# Patient Record
Sex: FEMALE | Race: WHITE | NOT HISPANIC OR LATINO | Employment: OTHER | URBAN - METROPOLITAN AREA
[De-identification: names, ages, dates, MRNs, and addresses within clinical notes are randomized per-mention and may not be internally consistent; named-entity substitution may affect disease eponyms.]

---

## 2017-03-01 ENCOUNTER — OFFICE VISIT (OUTPATIENT)
Dept: ENDOCRINOLOGY | Age: 57
End: 2017-03-01

## 2017-03-01 VITALS
BODY MASS INDEX: 35.16 KG/M2 | SYSTOLIC BLOOD PRESSURE: 106 MMHG | DIASTOLIC BLOOD PRESSURE: 66 MMHG | HEIGHT: 67 IN | WEIGHT: 224 LBS

## 2017-03-01 DIAGNOSIS — R13.10 DYSPHAGIA, UNSPECIFIED TYPE: ICD-10-CM

## 2017-03-01 DIAGNOSIS — E55.9 VITAMIN D DEFICIENCY DISEASE: ICD-10-CM

## 2017-03-01 DIAGNOSIS — E66.9 GENERALIZED OBESITY: ICD-10-CM

## 2017-03-01 DIAGNOSIS — Z78.0 MENOPAUSE PRESENT: ICD-10-CM

## 2017-03-01 DIAGNOSIS — R73.03 PREDIABETES: Primary | ICD-10-CM

## 2017-03-01 DIAGNOSIS — Z01.419 ENCOUNTER FOR GYNECOLOGICAL EXAMINATION WITHOUT ABNORMAL FINDING: ICD-10-CM

## 2017-03-01 PROCEDURE — 99214 OFFICE O/P EST MOD 30 MIN: CPT | Performed by: NURSE PRACTITIONER

## 2017-03-01 RX ORDER — ERGOCALCIFEROL 1.25 MG/1
CAPSULE ORAL
Qty: 12 CAPSULE | Refills: 1 | Status: SHIPPED | OUTPATIENT
Start: 2017-03-01 | End: 2017-08-15 | Stop reason: SDUPTHER

## 2017-03-01 NOTE — PROGRESS NOTES
"Clarice Craft is a 56 y.o. female is here today for follow-up.  Chief Complaint   Patient presents with   • Prediabetes     recent labs   • dyslipidemia   • hyperuricemia     Visit Vitals   • /66   • Ht 67\" (170.2 cm)   • Wt 224 lb (102 kg)   • BMI 35.08 kg/m2     Current Outpatient Prescriptions on File Prior to Visit   Medication Sig   • allopurinol (ZYLOPRIM) 100 MG tablet Take 1 tablet by mouth Daily.   • Calcium-Ergocalciferol 250-125 MG-UNIT tablet Take  by mouth.   • Cholecalciferol (VITAMIN D3) 1000 UNITS capsule Take 1,000 Units by mouth Daily.   • CONTRAVE 8-90 MG tablet Take 2 tablets by mouth 2 (Two) Times a Day.   • Cyanocobalamin (VITAMIN B-12) 1000 MCG/15ML liquid Take  by mouth.   • DIGESTIVE AIDS MIXTURE PO Take  by mouth.   • FOLIC ACID PO Take 133 mcg by mouth Daily.   • Misc Natural Products (TOTAL CARDIO HEALTH FORMULA) capsule Take  by mouth.   • Multiple Vitamin (MULTIVITAMIN) tablet Take 1 tablet by mouth.   • Nutritional Supplements (MENOPAUSE FORMULA PO) Take  by mouth.   • Omega 3 1200 MG capsule Take  by mouth.   • Probiotic Product (PROBIOTIC ADVANCED PO) Take  by mouth.   • rosuvastatin (CRESTOR) 10 MG tablet Take 1 tablet by mouth Every Night.   • Specialty Vitamins Products (HEART HEALTH PACK PO) Take  by mouth.   • MELALEUCA SC Inject  under the skin.     No current facility-administered medications on file prior to visit.      Family History   Problem Relation Age of Onset   • Thyroid disease Maternal Grandmother      Social History   Substance Use Topics   • Smoking status: Never Smoker   • Smokeless tobacco: None   • Alcohol use Yes     No Known Allergies      History of Present Illness  Encounter Diagnoses   Name Primary?   • Vitamin D deficiency disease    • Menopause present    • Generalized obesity    • Encounter for gynecological examination without abnormal finding    • Prediabetes Yes   • Dysphagia, unspecified type      This is a 56-year-old female " patient here today for routine follow-up visit of the above-mentioned problems.  She is agitated at today's visit because of her weight prior to be in seen.  She has a family history of hypothyroidism however she herself has not been diagnosed.  She had recent labs from her primary care provider which was reviewed at show that she has prediabetes with an A1c of 5.8.  She has been attending classes over at [facility] for diabetes prevention.  She has been taking over-the-counter vitamin D and her recent vitamin D level shows it is still low.  She has been following with her primary care provider for vitamin D as well as the prediabetes.  She has a history of menopausal symptoms however she states her mother had a history of breast cancer so she is not a candidate for hormone replacement therapy.  Her recent cholesterol was checked by her primary care provider which shows it was in satisfactory range.  She is complaining of problems with dysphasia history she is had a growth in her neck that she was told about in the past however she feels it has not been checked in the past 3-4 years and she is not exactly sure if it was a thyroid nodule.          The following portions of the patient's history were reviewed and updated as appropriate: allergies, current medications, past family history, past medical history, past social history, past surgical history and problem list.    Review of Systems   Constitutional: Negative for chills.   HENT: Negative for drooling.    Eyes: Negative for redness.   Respiratory: Negative for choking.    Cardiovascular: Negative for palpitations.   Gastrointestinal: Negative for abdominal distention.   Endocrine: Negative for polydipsia.   Genitourinary: Negative for dysuria.   Musculoskeletal: Negative for neck pain.   Skin: Negative for color change.   Allergic/Immunologic: Negative for food allergies.   Neurological: Negative for tremors.   Hematological: Negative for adenopathy.    Psychiatric/Behavioral: The patient is not hyperactive.        Objective   Physical Exam   Constitutional: She is oriented to person, place, and time. She appears well-developed and well-nourished. No distress.   Morbidly obese   HENT:   Head: Normocephalic and atraumatic.   Right Ear: External ear normal.   Left Ear: External ear normal.   Nose: Nose normal.   Mouth/Throat: Oropharynx is clear and moist. No oropharyngeal exudate.   Eyes: Conjunctivae and EOM are normal. Pupils are equal, round, and reactive to light. Right eye exhibits no discharge. Left eye exhibits no discharge. No scleral icterus.   Neck: Normal range of motion. Neck supple. No JVD present. No tracheal deviation present. No thyromegaly present.   Cardiovascular: Normal rate, regular rhythm, normal heart sounds and intact distal pulses.  Exam reveals no gallop and no friction rub.    No murmur heard.  Pulmonary/Chest: Effort normal and breath sounds normal. No stridor. No respiratory distress. She has no wheezes. She has no rales. She exhibits no tenderness.   Abdominal: Soft. Bowel sounds are normal. She exhibits no distension and no mass. There is no tenderness. There is no rebound and no guarding. No hernia.   Musculoskeletal: Normal range of motion. She exhibits no edema, tenderness or deformity.   Lymphadenopathy:     She has no cervical adenopathy.   Neurological: She is alert and oriented to person, place, and time. She has normal reflexes. She displays normal reflexes. No cranial nerve deficit. She exhibits normal muscle tone. Coordination normal.   Skin: Skin is warm and dry. No rash noted. She is not diaphoretic. No erythema. No pallor.   Psychiatric: She has a normal mood and affect. Her behavior is normal. Judgment and thought content normal.   Nursing note reviewed.        Assessment/Plan     Encounter Diagnoses   Name Primary?   • Vitamin D deficiency disease    • Menopause present    • Generalized obesity    • Encounter for  gynecological examination without abnormal finding    • Prediabetes Yes   • Dysphagia, unspecified type          In summary, patient was seen and examined.  She had recent labs done by her primary care provider which were reviewed at today's visit.  She is currently not controlled with her vitamin D deficiency on over-the-counter vitamin D.  Prescription for vitamin D 50,000 units was sent to her pharmacy.  She is complaining of dysphagia at a history of a growth in her neck.  She has no record of a thyroid ultrasound on file.  A thyroid ultrasound will be scheduled.  She is currently attending classes for diabetes prevention but is currently not on any medication.  She is working to lose weight and is on contrave which she states is working with helping her to lose weight.  She will follow-up with Dr. Duran at her next scheduled appointment.  She'll be notified of her thyroid results along with any further recommendations.

## 2017-03-09 ENCOUNTER — APPOINTMENT (OUTPATIENT)
Dept: ULTRASOUND IMAGING | Facility: HOSPITAL | Age: 57
End: 2017-03-09

## 2017-03-15 DIAGNOSIS — R13.10 DYSPHAGIA, UNSPECIFIED TYPE: Primary | ICD-10-CM

## 2017-03-29 ENCOUNTER — TELEPHONE (OUTPATIENT)
Dept: ENDOCRINOLOGY | Age: 57
End: 2017-03-29

## 2017-03-29 NOTE — TELEPHONE ENCOUNTER
CALLED PT LEFT  ASKING TO RETURN CALL IN REGARDS TO HER THRYOID US.        ----- Message from GILBERTO Dash sent at 3/29/2017 12:56 PM EDT -----  Send pt a copy of her thyroid u/s. We will need to monitor the nodules periodically. Let her know. thanks

## 2017-03-30 ENCOUNTER — TELEPHONE (OUTPATIENT)
Dept: ENDOCRINOLOGY | Age: 57
End: 2017-03-30

## 2017-03-30 NOTE — TELEPHONE ENCOUNTER
RETURNED PTS CALL. INFORMED HER THAT I MAILED HER COPY OF HER THYROID U/S AND CAN TAKE A FEW DAYS TO RECEIVE AS WELL AS INFORMED HER THAT WE ARE GOING TO MONITOR HER NODULES PERIODICALLY. INFORMED PT TO CB IF SHE HAS ANY PROBLEMS OR QUEST. PT STATED VERBAL UNDERSTANDING.

## 2017-05-22 DIAGNOSIS — E79.0 HYPERURICEMIA: ICD-10-CM

## 2017-05-22 RX ORDER — ALLOPURINOL 100 MG/1
100 TABLET ORAL DAILY
Qty: 30 TABLET | Refills: 2 | Status: SHIPPED | OUTPATIENT
Start: 2017-05-22 | End: 2017-08-15 | Stop reason: SDUPTHER

## 2017-08-15 ENCOUNTER — OFFICE VISIT (OUTPATIENT)
Dept: ENDOCRINOLOGY | Age: 57
End: 2017-08-15

## 2017-08-15 VITALS
WEIGHT: 223 LBS | HEIGHT: 66 IN | SYSTOLIC BLOOD PRESSURE: 128 MMHG | RESPIRATION RATE: 16 BRPM | DIASTOLIC BLOOD PRESSURE: 88 MMHG | BODY MASS INDEX: 35.84 KG/M2

## 2017-08-15 DIAGNOSIS — E79.0 HYPERURICEMIA: ICD-10-CM

## 2017-08-15 DIAGNOSIS — Z78.0 MENOPAUSE PRESENT: ICD-10-CM

## 2017-08-15 DIAGNOSIS — E66.9 GENERALIZED OBESITY: ICD-10-CM

## 2017-08-15 DIAGNOSIS — E78.5 DYSLIPIDEMIA: ICD-10-CM

## 2017-08-15 DIAGNOSIS — E55.9 VITAMIN D DEFICIENCY DISEASE: ICD-10-CM

## 2017-08-15 DIAGNOSIS — R73.03 PREDIABETES: Primary | ICD-10-CM

## 2017-08-15 PROCEDURE — 99214 OFFICE O/P EST MOD 30 MIN: CPT | Performed by: INTERNAL MEDICINE

## 2017-08-15 RX ORDER — LEVOCETIRIZINE DIHYDROCHLORIDE 5 MG/1
5 TABLET, FILM COATED ORAL EVERY EVENING
COMMUNITY
End: 2018-02-15

## 2017-08-15 RX ORDER — ALLOPURINOL 100 MG/1
100 TABLET ORAL DAILY
Qty: 30 TABLET | Refills: 5 | Status: SHIPPED | OUTPATIENT
Start: 2017-08-15 | End: 2018-02-15 | Stop reason: SDUPTHER

## 2017-08-15 RX ORDER — ERGOCALCIFEROL 1.25 MG/1
CAPSULE ORAL
Qty: 13 CAPSULE | Refills: 3 | Status: SHIPPED | OUTPATIENT
Start: 2017-08-15 | End: 2018-02-15 | Stop reason: SDUPTHER

## 2017-08-15 RX ORDER — ROSUVASTATIN CALCIUM 10 MG/1
10 TABLET, COATED ORAL NIGHTLY
Qty: 30 TABLET | Refills: 11 | Status: SHIPPED | OUTPATIENT
Start: 2017-08-15 | End: 2018-08-25 | Stop reason: SDUPTHER

## 2017-08-15 NOTE — PROGRESS NOTES
"Subjective   Lani Craft is a 57 y.o. female seen for follow up for prediabetes, metabolic syndrome. No lab review. She has been having this deep intense itching on torso area since last winter. She denies any other problems or concerns.     History of Present Illness this is a 57-year-old female known patient with obesity as well as prediabetes and metabolic syndrome as well as hyperuricemia and dyslipidemia.  Over the course of last 6 months she has had no significant health problems for which to go to the emergency room or hospital.  On further questioning she admitted to the fact that she is using with her detergent a fabric softener anti-static sheets.  She has lost total of 30 pounds with diet and exercise and taking Contrave    /88  Resp 16  Ht 66\" (167.6 cm)  Wt 223 lb (101 kg)  BMI 35.99 kg/m2     No Known Allergies    Current Outpatient Prescriptions:   •  allopurinol (ZYLOPRIM) 100 MG tablet, Take 1 tablet by mouth Daily., Disp: 30 tablet, Rfl: 2  •  CONTRAVE 8-90 MG tablet, Take 2 tablets by mouth 2 (Two) Times a Day., Disp: , Rfl:   •  Cyanocobalamin (VITAMIN B-12) 1000 MCG/15ML liquid, Take  by mouth., Disp: , Rfl:   •  DIGESTIVE AIDS MIXTURE PO, Take  by mouth., Disp: , Rfl:   •  ergocalciferol (DRISDOL) 01440 UNITS capsule, Take 1 po q week, Disp: 12 capsule, Rfl: 1  •  FOLIC ACID PO, Take 133 mcg by mouth Daily., Disp: , Rfl:   •  levocetirizine (XYZAL) 5 MG tablet, Take 5 mg by mouth Every Evening., Disp: , Rfl:   •  MELALEUCA SC, Inject  under the skin., Disp: , Rfl:   •  Misc Natural Products (TOTAL CARDIO HEALTH FORMULA) capsule, Take  by mouth., Disp: , Rfl:   •  Multiple Vitamin (MULTIVITAMIN) tablet, Take 1 tablet by mouth., Disp: , Rfl:   •  Nutritional Supplements (MENOPAUSE FORMULA PO), Take  by mouth., Disp: , Rfl:   •  Omega 3 1200 MG capsule, Take  by mouth., Disp: , Rfl:   •  Probiotic Product (PROBIOTIC ADVANCED PO), Take  by mouth., Disp: , Rfl:   •  rosuvastatin (CRESTOR) " 10 MG tablet, Take 1 tablet by mouth Every Night., Disp: 30 tablet, Rfl: 11  •  Specialty Vitamins Products (HEART HEALTH PACK PO), Take  by mouth., Disp: , Rfl:     The following portions of the patient's history were reviewed and updated as appropriate: allergies, current medications, past family history, past medical history, past social history, past surgical history and problem list.    Review of Systems   Constitutional: Negative.    HENT: Negative.    Eyes: Negative.    Respiratory: Negative.    Cardiovascular: Negative.    Gastrointestinal: Negative.    Endocrine: Negative.    Genitourinary: Negative.    Musculoskeletal: Negative.    Skin: Negative.    Allergic/Immunologic: Negative.    Neurological: Negative.    Hematological: Negative.    Psychiatric/Behavioral: Negative.        Objective   Physical Exam   Constitutional: She is oriented to person, place, and time. She appears well-developed and well-nourished. No distress.   Morbidly obese   HENT:   Head: Normocephalic and atraumatic.   Right Ear: External ear normal.   Left Ear: External ear normal.   Nose: Nose normal.   Mouth/Throat: Oropharynx is clear and moist. No oropharyngeal exudate.   Eyes: Conjunctivae and EOM are normal. Pupils are equal, round, and reactive to light. Right eye exhibits no discharge. Left eye exhibits no discharge. No scleral icterus.   Neck: Normal range of motion. Neck supple. No JVD present. No tracheal deviation present. No thyromegaly present.   Cardiovascular: Normal rate, regular rhythm, normal heart sounds and intact distal pulses.  Exam reveals no gallop and no friction rub.    No murmur heard.  Pulmonary/Chest: Effort normal and breath sounds normal. No stridor. No respiratory distress. She has no wheezes. She has no rales. She exhibits no tenderness.   Abdominal: Soft. Bowel sounds are normal. She exhibits no distension and no mass. There is no tenderness. There is no rebound and no guarding. No hernia.    Musculoskeletal: Normal range of motion. She exhibits no edema, tenderness or deformity.   Lymphadenopathy:     She has no cervical adenopathy.   Neurological: She is alert and oriented to person, place, and time. She has normal reflexes. She displays normal reflexes. No cranial nerve deficit. She exhibits normal muscle tone. Coordination normal.   Skin: Skin is warm and dry. No rash noted. She is not diaphoretic. No erythema. No pallor.   Psychiatric: She has a normal mood and affect. Her behavior is normal. Judgment and thought content normal.   Nursing note reviewed.        Assessment/Plan   Diagnoses and all orders for this visit:    Prediabetes  -     T3, Free  -     T4 & TSH ([facility])  -     T4, Free  -     Thyroglobulin With Anti-TG  -     Uric Acid  -     Vitamin D 25 Hydroxy  -     Comprehensive Metabolic Panel  -     C-Peptide  -     Hemoglobin A1c  -     Lipid Panel    Menopause present  -     T3, Free  -     T4 & TSH ([facility])  -     T4, Free  -     Thyroglobulin With Anti-TG  -     Uric Acid  -     Vitamin D 25 Hydroxy  -     Comprehensive Metabolic Panel  -     C-Peptide  -     Hemoglobin A1c  -     Lipid Panel    Vitamin D deficiency disease  -     T3, Free  -     T4 & TSH ([facility])  -     T4, Free  -     Thyroglobulin With Anti-TG  -     Uric Acid  -     Vitamin D 25 Hydroxy  -     Comprehensive Metabolic Panel  -     C-Peptide  -     Hemoglobin A1c  -     Lipid Panel    Generalized obesity  -     T3, Free  -     T4 & TSH ([facility])  -     T4, Free  -     Thyroglobulin With Anti-TG  -     Uric Acid  -     Vitamin D 25 Hydroxy  -     Comprehensive Metabolic Panel  -     C-Peptide  -     Hemoglobin A1c  -     Lipid Panel    Hyperuricemia  -     T3, Free  -     T4 & TSH ([facility])  -     T4, Free  -     Thyroglobulin With Anti-TG  -     Uric Acid  -     Vitamin D 25 Hydroxy  -     Comprehensive Metabolic Panel  -     C-Peptide  -     Hemoglobin A1c  -     Lipid Panel  -     allopurinol (ZYLOPRIM) 100 MG  tablet; Take 1 tablet by mouth Daily.    Dyslipidemia  -     rosuvastatin (CRESTOR) 10 MG tablet; Take 1 tablet by mouth Every Night.    Other orders  -     ergocalciferol (DRISDOL) 48976 units capsule; Take 1 po q week               In summary I saw and examined this 57-year-old female for above-mentioned problems.  I reviewed her laboratory evaluation of 10/04/2016 and at this point we will go ahead and order additional laboratory evaluation and once the results come back we will go ahead and inform her of any possible modifications or new medications.  I also asked her to try not using any fabric softener or anti-static she eats and use perfume free and hypoallergenic detergent for her wash.  She will see Ms. Anca Jaramillo in 6 months or sooner if needed with laboratory evaluation prior to each office visit.

## 2017-08-16 LAB
25(OH)D3+25(OH)D2 SERPL-MCNC: 36.1 NG/ML (ref 30–100)
ALBUMIN SERPL-MCNC: 4.9 G/DL (ref 3.5–5.2)
ALBUMIN/GLOB SERPL: 1.6 G/DL
ALP SERPL-CCNC: 79 U/L (ref 39–117)
ALT SERPL-CCNC: 16 U/L (ref 1–33)
AST SERPL-CCNC: 18 U/L (ref 1–32)
BILIRUB SERPL-MCNC: 0.2 MG/DL (ref 0.1–1.2)
BUN SERPL-MCNC: 15 MG/DL (ref 6–20)
BUN/CREAT SERPL: 13.8 (ref 7–25)
C PEPTIDE SERPL-MCNC: 2.8 NG/ML (ref 1.1–4.4)
CALCIUM SERPL-MCNC: 10.4 MG/DL (ref 8.6–10.5)
CHLORIDE SERPL-SCNC: 103 MMOL/L (ref 98–107)
CHOLEST SERPL-MCNC: 138 MG/DL (ref 0–200)
CO2 SERPL-SCNC: 24.1 MMOL/L (ref 22–29)
CREAT SERPL-MCNC: 1.09 MG/DL (ref 0.57–1)
GLOBULIN SER CALC-MCNC: 3 GM/DL
GLUCOSE SERPL-MCNC: 95 MG/DL (ref 65–99)
HBA1C MFR BLD: 5.7 % (ref 4.8–5.6)
HDLC SERPL-MCNC: 69 MG/DL (ref 40–60)
LDLC SERPL CALC-MCNC: 52 MG/DL (ref 0–100)
POTASSIUM SERPL-SCNC: 4.4 MMOL/L (ref 3.5–5.2)
PROT SERPL-MCNC: 7.9 G/DL (ref 6–8.5)
SODIUM SERPL-SCNC: 142 MMOL/L (ref 136–145)
T3FREE SERPL-MCNC: 2.8 PG/ML (ref 2–4.4)
T4 FREE SERPL-MCNC: 1.11 NG/DL (ref 0.93–1.7)
T4 SERPL-MCNC: 6.75 MCG/DL (ref 4.5–11.7)
THYROGLOB AB SERPL-ACNC: <1 IU/ML (ref 0–0.9)
THYROGLOB SERPL-MCNC: 8.1 NG/ML (ref 1.5–38.5)
TRIGL SERPL-MCNC: 84 MG/DL (ref 0–150)
TSH SERPL DL<=0.005 MIU/L-ACNC: 0.68 MIU/ML (ref 0.27–4.2)
URATE SERPL-MCNC: 6 MG/DL (ref 2.4–5.7)
VLDLC SERPL CALC-MCNC: 16.8 MG/DL (ref 5–40)

## 2018-01-25 DIAGNOSIS — E55.9 VITAMIN D DEFICIENCY DISEASE: Primary | ICD-10-CM

## 2018-01-25 DIAGNOSIS — R73.03 PREDIABETES: ICD-10-CM

## 2018-01-25 DIAGNOSIS — E79.0 HYPERURICEMIA: ICD-10-CM

## 2018-02-01 ENCOUNTER — LAB (OUTPATIENT)
Dept: ENDOCRINOLOGY | Age: 58
End: 2018-02-01

## 2018-02-01 DIAGNOSIS — E79.0 HYPERURICEMIA: ICD-10-CM

## 2018-02-01 DIAGNOSIS — E55.9 VITAMIN D DEFICIENCY DISEASE: ICD-10-CM

## 2018-02-01 DIAGNOSIS — R73.03 PREDIABETES: ICD-10-CM

## 2018-02-02 LAB
25(OH)D3+25(OH)D2 SERPL-MCNC: 59.1 NG/ML (ref 30–100)
ALBUMIN SERPL-MCNC: 4.8 G/DL (ref 3.5–5.2)
ALBUMIN/GLOB SERPL: 1.6 G/DL
ALP SERPL-CCNC: 70 U/L (ref 39–117)
ALT SERPL-CCNC: 17 U/L (ref 1–33)
AST SERPL-CCNC: 19 U/L (ref 1–32)
BILIRUB SERPL-MCNC: 0.3 MG/DL (ref 0.1–1.2)
BUN SERPL-MCNC: 20 MG/DL (ref 6–20)
BUN/CREAT SERPL: 15 (ref 7–25)
C PEPTIDE SERPL-MCNC: 3.8 NG/ML (ref 1.1–4.4)
CALCIUM SERPL-MCNC: 10 MG/DL (ref 8.6–10.5)
CHLORIDE SERPL-SCNC: 102 MMOL/L (ref 98–107)
CHOLEST SERPL-MCNC: 136 MG/DL (ref 0–200)
CO2 SERPL-SCNC: 24.9 MMOL/L (ref 22–29)
CREAT SERPL-MCNC: 1.33 MG/DL (ref 0.57–1)
GFR SERPLBLD CREATININE-BSD FMLA CKD-EPI: 41 ML/MIN/1.73
GFR SERPLBLD CREATININE-BSD FMLA CKD-EPI: 50 ML/MIN/1.73
GLOBULIN SER CALC-MCNC: 3 GM/DL
GLUCOSE SERPL-MCNC: 100 MG/DL (ref 65–99)
HBA1C MFR BLD: 5.3 % (ref 4.8–5.6)
HDLC SERPL-MCNC: 64 MG/DL (ref 40–60)
INTERPRETATION: NORMAL
LDLC SERPL CALC-MCNC: 54 MG/DL (ref 0–100)
MICROALBUMIN UR-MCNC: 30.4 UG/ML
POTASSIUM SERPL-SCNC: 4.9 MMOL/L (ref 3.5–5.2)
PROT SERPL-MCNC: 7.8 G/DL (ref 6–8.5)
SODIUM SERPL-SCNC: 141 MMOL/L (ref 136–145)
TRIGL SERPL-MCNC: 90 MG/DL (ref 0–150)
URATE SERPL-MCNC: 4.9 MG/DL (ref 2.4–5.7)
VLDLC SERPL CALC-MCNC: 18 MG/DL (ref 5–40)

## 2018-02-15 ENCOUNTER — OFFICE VISIT (OUTPATIENT)
Dept: ENDOCRINOLOGY | Age: 58
End: 2018-02-15

## 2018-02-15 VITALS
WEIGHT: 229 LBS | SYSTOLIC BLOOD PRESSURE: 110 MMHG | DIASTOLIC BLOOD PRESSURE: 80 MMHG | HEIGHT: 66 IN | BODY MASS INDEX: 36.8 KG/M2

## 2018-02-15 DIAGNOSIS — E55.9 VITAMIN D DEFICIENCY DISEASE: ICD-10-CM

## 2018-02-15 DIAGNOSIS — E79.0 HYPERURICEMIA: ICD-10-CM

## 2018-02-15 DIAGNOSIS — R73.03 PREDIABETES: Primary | ICD-10-CM

## 2018-02-15 PROCEDURE — 99214 OFFICE O/P EST MOD 30 MIN: CPT | Performed by: NURSE PRACTITIONER

## 2018-02-15 RX ORDER — ALLOPURINOL 100 MG/1
100 TABLET ORAL DAILY
Qty: 90 TABLET | Refills: 1 | Status: SHIPPED | OUTPATIENT
Start: 2018-02-15 | End: 2018-08-24 | Stop reason: SDUPTHER

## 2018-02-15 RX ORDER — ERGOCALCIFEROL 1.25 MG/1
CAPSULE ORAL
Qty: 12 CAPSULE | Refills: 1 | Status: SHIPPED | OUTPATIENT
Start: 2018-02-15 | End: 2018-07-23 | Stop reason: SDUPTHER

## 2018-02-15 NOTE — PROGRESS NOTES
"Clarice Craft is a 57 y.o. female is here today for follow-up.  Chief Complaint   Patient presents with   • Prediabetes     recent labs   • Hyperlipidemia     concerned about weight gain   • Vitamin D Deficiency   • hyperuricemia   • Obesity     /80  Ht 167.6 cm (66\")  Wt 104 kg (229 lb)  BMI 36.96 kg/m2  Current Outpatient Prescriptions on File Prior to Visit   Medication Sig   • allopurinol (ZYLOPRIM) 100 MG tablet Take 1 tablet by mouth Daily.   • CONTRAVE 8-90 MG tablet Take 2 tablets by mouth 2 (Two) Times a Day.   • Cyanocobalamin (VITAMIN B-12) 1000 MCG/15ML liquid Take  by mouth.   • ergocalciferol (DRISDOL) 55047 units capsule Take 1 po q week   • rosuvastatin (CRESTOR) 10 MG tablet Take 1 tablet by mouth Every Night.   • [DISCONTINUED] DIGESTIVE AIDS MIXTURE PO Take  by mouth.   • [DISCONTINUED] FOLIC ACID PO Take 133 mcg by mouth Daily.   • [DISCONTINUED] levocetirizine (XYZAL) 5 MG tablet Take 5 mg by mouth Every Evening.   • [DISCONTINUED] MELALEUCA SC Inject  under the skin.   • [DISCONTINUED] Misc Natural Products (TOTAL CARDIO HEALTH FORMULA) capsule Take  by mouth.   • [DISCONTINUED] Multiple Vitamin (MULTIVITAMIN) tablet Take 1 tablet by mouth.   • [DISCONTINUED] Nutritional Supplements (MENOPAUSE FORMULA PO) Take  by mouth.   • [DISCONTINUED] Omega 3 1200 MG capsule Take  by mouth.   • [DISCONTINUED] Probiotic Product (PROBIOTIC ADVANCED PO) Take  by mouth.   • [DISCONTINUED] Specialty Vitamins Products (HEART HEALTH PACK PO) Take  by mouth.     No current facility-administered medications on file prior to visit.      Family History   Problem Relation Age of Onset   • Thyroid disease Maternal Grandmother      Social History   Substance Use Topics   • Smoking status: Never Smoker   • Smokeless tobacco: None   • Alcohol use Yes     No Known Allergies      History of Present Illness  Encounter Diagnoses   Name Primary?   • Prediabetes Yes   • Hyperuricemia    • Vitamin D " deficiency disease    • Generalized obesity    This is a 57-year-old male patient here today for routine follow-up visit.  She has been seen for the above-mentioned diagnoses.  She states her energy is not great however she is getting about 9 hours of sleep a night.  She does have problems falling asleep at times.  She had recent labs which were reviewed and she was provided a copy.  She is managed by her primary care provider for her obesity.  She states she has been put on contrave but she feels like It is not helping as much.  Her  lost his job 2-1/2 years ago and they are struggling financially so she feels this does contribute to her eating in excess at night.  She is drinking plenty of fluids.  She is not exercising routinely or watching her diet.  Her medication list was reviewed at today's visit as well as her recent labs.  She has an appointment later today to see her primary care provider and will discuss her concerns for weight loss.      The following portions of the patient's history were reviewed and updated as appropriate: allergies, current medications, past family history, past medical history, past social history, past surgical history and problem list.    Review of Systems   Constitutional: Negative for fatigue.   HENT: Negative for trouble swallowing.    Eyes: Negative for visual disturbance.   Respiratory: Negative for shortness of breath.    Cardiovascular: Negative for leg swelling.   Endocrine: Negative for polyuria.   Skin: Negative for wound.   Neurological: Negative for numbness.       Objective   Physical Exam   Constitutional: She is oriented to person, place, and time. She appears well-developed and well-nourished. No distress.   Morbidly obese   HENT:   Head: Normocephalic and atraumatic.   Right Ear: External ear normal.   Left Ear: External ear normal.   Nose: Nose normal.   Mouth/Throat: Oropharynx is clear and moist. No oropharyngeal exudate.   Eyes: Conjunctivae and EOM are  normal. Pupils are equal, round, and reactive to light. Right eye exhibits no discharge. Left eye exhibits no discharge. No scleral icterus.   Neck: Normal range of motion. Neck supple. No JVD present. No tracheal deviation present. No thyromegaly present.   Cardiovascular: Normal rate, regular rhythm, normal heart sounds and intact distal pulses.  Exam reveals no gallop and no friction rub.    No murmur heard.  Pulmonary/Chest: Effort normal and breath sounds normal. No stridor. No respiratory distress. She has no wheezes. She has no rales. She exhibits no tenderness.   Abdominal: Soft. Bowel sounds are normal. She exhibits no distension and no mass. There is no tenderness. There is no rebound and no guarding. No hernia.   Musculoskeletal: Normal range of motion. She exhibits no edema, tenderness or deformity.   Lymphadenopathy:     She has no cervical adenopathy.   Neurological: She is alert and oriented to person, place, and time. She has normal reflexes. She displays normal reflexes. No cranial nerve deficit. She exhibits normal muscle tone. Coordination normal.   Skin: Skin is warm and dry. No rash noted. She is not diaphoretic. No erythema. No pallor.   Psychiatric: She has a normal mood and affect. Her behavior is normal. Judgment and thought content normal.   Nursing note reviewed.    Results for orders placed or performed in visit on 02/01/18   Comprehensive Metabolic Panel   Result Value Ref Range    Glucose 100 (H) 65 - 99 mg/dL    BUN 20 6 - 20 mg/dL    Creatinine 1.33 (H) 0.57 - 1.00 mg/dL    eGFR Non African Am 41 (L) >60 mL/min/1.73    eGFR African Am 50 (L) >60 mL/min/1.73    BUN/Creatinine Ratio 15.0 7.0 - 25.0    Sodium 141 136 - 145 mmol/L    Potassium 4.9 3.5 - 5.2 mmol/L    Chloride 102 98 - 107 mmol/L    Total CO2 24.9 22.0 - 29.0 mmol/L    Calcium 10.0 8.6 - 10.5 mg/dL    Total Protein 7.8 6.0 - 8.5 g/dL    Albumin 4.80 3.50 - 5.20 g/dL    Globulin 3.0 gm/dL    A/G Ratio 1.6 g/dL    Total  Bilirubin 0.3 0.1 - 1.2 mg/dL    Alkaline Phosphatase 70 39 - 117 U/L    AST (SGOT) 19 1 - 32 U/L    ALT (SGPT) 17 1 - 33 U/L   C-Peptide   Result Value Ref Range    C-Peptide 3.8 1.1 - 4.4 ng/mL   Hemoglobin A1c   Result Value Ref Range    Hemoglobin A1C 5.30 4.80 - 5.60 %   Lipid Panel   Result Value Ref Range    Total Cholesterol 136 0 - 200 mg/dL    Triglycerides 90 0 - 150 mg/dL    HDL Cholesterol 64 (H) 40 - 60 mg/dL    VLDL Cholesterol 18 5 - 40 mg/dL    LDL Cholesterol  54 0 - 100 mg/dL   Vitamin D 25 Hydroxy   Result Value Ref Range    25 Hydroxy, Vitamin D 59.1 30.0 - 100.0 ng/mL   Uric Acid   Result Value Ref Range    Uric Acid 4.9 2.4 - 5.7 mg/dL   MicroAlbumin, Urine, Random   Result Value Ref Range    Microalbumin, Urine 30.4 Not Estab. ug/mL   Cardiovascular Risk Assessment   Result Value Ref Range    Interpretation Note          Assessment/Plan   Problems Addressed this Visit        Digestive    Vitamin D deficiency disease    Generalized obesity       Other    Prediabetes - Primary    Hyperuricemia          In summary, patient was seen and examined.  Metabolically she is stable.  Her recent labs were reviewed and her A1c has decreased slightly.  She was counseled today on diet and exercise.  Her contrave is managed by her primary care provider.  She's got an appointment to see her later today.  She is to well on her current medications.  She is not taking a lot of her nutritional supplement that she was on prior because of her 's financial situation.  Her vitamin D level is in satisfactory range.  She will T all her current medications as prescribed.  She was encouraged to diet and exercise to assist with her weight loss goals.  Uric acid is in normal range. She will follow-up in 6 months with Dr. Duran with labs prior.  I've encouraged her to contact the office should she have any questions or concerns prior to then

## 2018-07-23 RX ORDER — ERGOCALCIFEROL 1.25 MG/1
CAPSULE ORAL
Qty: 12 CAPSULE | Refills: 0 | Status: SHIPPED | OUTPATIENT
Start: 2018-07-23 | End: 2018-08-24 | Stop reason: SDUPTHER

## 2018-07-30 DIAGNOSIS — R73.03 PREDIABETES: ICD-10-CM

## 2018-07-30 DIAGNOSIS — E79.0 HYPERURICEMIA: ICD-10-CM

## 2018-07-30 DIAGNOSIS — E55.9 VITAMIN D DEFICIENCY DISEASE: Primary | ICD-10-CM

## 2018-08-16 ENCOUNTER — LAB (OUTPATIENT)
Dept: ENDOCRINOLOGY | Age: 58
End: 2018-08-16

## 2018-08-16 DIAGNOSIS — E55.9 VITAMIN D DEFICIENCY DISEASE: ICD-10-CM

## 2018-08-16 DIAGNOSIS — E79.0 HYPERURICEMIA: ICD-10-CM

## 2018-08-16 DIAGNOSIS — R73.03 PREDIABETES: ICD-10-CM

## 2018-08-17 LAB
25(OH)D3+25(OH)D2 SERPL-MCNC: 45.5 NG/ML (ref 30–100)
ALBUMIN SERPL-MCNC: 5.1 G/DL (ref 3.5–5.2)
ALBUMIN/GLOB SERPL: 2.1 G/DL
ALP SERPL-CCNC: 69 U/L (ref 39–117)
ALT SERPL-CCNC: 15 U/L (ref 1–33)
AST SERPL-CCNC: 15 U/L (ref 1–32)
BILIRUB SERPL-MCNC: 0.4 MG/DL (ref 0.1–1.2)
BUN SERPL-MCNC: 11 MG/DL (ref 6–20)
BUN/CREAT SERPL: 11.7 (ref 7–25)
C PEPTIDE SERPL-MCNC: 1.9 NG/ML (ref 1.1–4.4)
CALCIUM SERPL-MCNC: 9.8 MG/DL (ref 8.6–10.5)
CHLORIDE SERPL-SCNC: 101 MMOL/L (ref 98–107)
CHOLEST SERPL-MCNC: 120 MG/DL (ref 0–200)
CO2 SERPL-SCNC: 25.4 MMOL/L (ref 22–29)
CREAT SERPL-MCNC: 0.94 MG/DL (ref 0.57–1)
GLOBULIN SER CALC-MCNC: 2.4 GM/DL
GLUCOSE SERPL-MCNC: 85 MG/DL (ref 65–99)
HBA1C MFR BLD: 5.6 % (ref 4.8–5.6)
HDLC SERPL-MCNC: 59 MG/DL (ref 40–60)
INTERPRETATION: NORMAL
LDLC SERPL CALC-MCNC: 50 MG/DL (ref 0–100)
Lab: NORMAL
POTASSIUM SERPL-SCNC: 4.3 MMOL/L (ref 3.5–5.2)
PROT SERPL-MCNC: 7.5 G/DL (ref 6–8.5)
SODIUM SERPL-SCNC: 139 MMOL/L (ref 136–145)
T4 SERPL-MCNC: 7.61 MCG/DL (ref 4.5–11.7)
TRIGL SERPL-MCNC: 56 MG/DL (ref 0–150)
TSH SERPL DL<=0.005 MIU/L-ACNC: 0.43 MIU/ML (ref 0.27–4.2)
URATE SERPL-MCNC: 4.7 MG/DL (ref 2.4–5.7)
VLDLC SERPL CALC-MCNC: 11.2 MG/DL (ref 5–40)

## 2018-08-24 ENCOUNTER — OFFICE VISIT (OUTPATIENT)
Dept: ENDOCRINOLOGY | Age: 58
End: 2018-08-24

## 2018-08-24 VITALS
RESPIRATION RATE: 16 BRPM | BODY MASS INDEX: 35 KG/M2 | HEIGHT: 66 IN | SYSTOLIC BLOOD PRESSURE: 118 MMHG | DIASTOLIC BLOOD PRESSURE: 76 MMHG | WEIGHT: 217.8 LBS

## 2018-08-24 DIAGNOSIS — E55.9 VITAMIN D DEFICIENCY DISEASE: ICD-10-CM

## 2018-08-24 DIAGNOSIS — E66.9 GENERALIZED OBESITY: ICD-10-CM

## 2018-08-24 DIAGNOSIS — E78.5 DYSLIPIDEMIA: ICD-10-CM

## 2018-08-24 DIAGNOSIS — R73.03 PREDIABETES: Primary | ICD-10-CM

## 2018-08-24 DIAGNOSIS — E79.0 HYPERURICEMIA: ICD-10-CM

## 2018-08-24 PROCEDURE — 99214 OFFICE O/P EST MOD 30 MIN: CPT | Performed by: INTERNAL MEDICINE

## 2018-08-24 RX ORDER — ALLOPURINOL 100 MG/1
100 TABLET ORAL DAILY
Qty: 90 TABLET | Refills: 3 | Status: SHIPPED | OUTPATIENT
Start: 2018-08-24 | End: 2019-04-04 | Stop reason: SDUPTHER

## 2018-08-24 RX ORDER — ERGOCALCIFEROL 1.25 MG/1
CAPSULE ORAL
Qty: 13 CAPSULE | Refills: 3 | Status: SHIPPED | OUTPATIENT
Start: 2018-08-24 | End: 2019-03-25 | Stop reason: SDUPTHER

## 2018-08-24 RX ORDER — SERTRALINE HYDROCHLORIDE 25 MG/1
25 TABLET, FILM COATED ORAL DAILY
COMMUNITY
End: 2019-04-08 | Stop reason: SDUPTHER

## 2018-08-24 RX ORDER — LIRAGLUTIDE 6 MG/ML
INJECTION, SOLUTION SUBCUTANEOUS
Qty: 15 PEN | Refills: 3 | Status: SHIPPED | OUTPATIENT
Start: 2018-08-24 | End: 2019-04-04

## 2018-08-24 NOTE — PROGRESS NOTES
"Subjective   Lani Craft is a 58 y.o. female seen for follow up for prediabetes, metabolic syndrome, lab review. She has been on Saxenda x 8 weeks and states that she has lost 11 pounds. She denies any other problems or concerns.    History of Present Illness this is a 58-year-old female known patient with prediabetes and obesity as well as vitamin D deficiency and hyperuricemia.  Over the course of last 6 months she has had no significant health problems for which to go to the emergency room or hospital.  She has lost 11 pounds in the past 8 weeks and she feels very good about it.    /76   Resp 16   Ht 167.6 cm (66\")   Wt 98.8 kg (217 lb 12.8 oz)   BMI 35.15 kg/m²      No Known Allergies    Current Outpatient Prescriptions:   •  allopurinol (ZYLOPRIM) 100 MG tablet, Take 1 tablet by mouth Daily., Disp: 90 tablet, Rfl: 1  •  Cyanocobalamin (VITAMIN B-12) 1000 MCG/15ML liquid, Take  by mouth., Disp: , Rfl:   •  ergocalciferol (DRISDOL) 00958 units capsule, Take 1 po q week, Disp: 12 capsule, Rfl: 0  •  Liraglutide -Weight Management (SAXENDA) 18 MG/3ML solution pen-injector, Inject  under the skin into the appropriate area as directed., Disp: , Rfl:   •  sertraline (ZOLOFT) 25 MG tablet, Take 25 mg by mouth Daily., Disp: , Rfl:     The following portions of the patient's history were reviewed and updated as appropriate: allergies, current medications, past family history, past medical history, past social history, past surgical history and problem list.    Review of Systems   Constitutional: Negative.    HENT: Negative.    Eyes: Negative.    Respiratory: Negative.    Cardiovascular: Negative.    Gastrointestinal: Negative.    Endocrine: Negative.    Genitourinary: Negative.    Musculoskeletal: Negative.    Skin: Negative.    Allergic/Immunologic: Negative.    Neurological: Negative.    Hematological: Negative.    Psychiatric/Behavioral: Negative.        Objective   Physical Exam   Constitutional: She is " oriented to person, place, and time. She appears well-developed and well-nourished. No distress.   Morbidly obese   HENT:   Head: Normocephalic and atraumatic.   Right Ear: External ear normal.   Left Ear: External ear normal.   Nose: Nose normal.   Mouth/Throat: Oropharynx is clear and moist. No oropharyngeal exudate.   Eyes: Pupils are equal, round, and reactive to light. Conjunctivae and EOM are normal. Right eye exhibits no discharge. Left eye exhibits no discharge. No scleral icterus.   Neck: Normal range of motion. Neck supple. No JVD present. No tracheal deviation present. No thyromegaly present.   Cardiovascular: Normal rate, regular rhythm, normal heart sounds and intact distal pulses.  Exam reveals no gallop and no friction rub.    No murmur heard.  Pulmonary/Chest: Effort normal and breath sounds normal. No stridor. No respiratory distress. She has no wheezes. She has no rales. She exhibits no tenderness.   Abdominal: Soft. Bowel sounds are normal. She exhibits no distension and no mass. There is no tenderness. There is no rebound and no guarding. No hernia.   Musculoskeletal: Normal range of motion. She exhibits no edema, tenderness or deformity.   Lymphadenopathy:     She has no cervical adenopathy.   Neurological: She is alert and oriented to person, place, and time. She has normal reflexes. She displays normal reflexes. No cranial nerve deficit or sensory deficit. She exhibits normal muscle tone. Coordination normal.   Skin: Skin is warm and dry. No rash noted. She is not diaphoretic. No erythema. No pallor.   Psychiatric: She has a normal mood and affect. Her behavior is normal. Judgment and thought content normal.   Nursing note reviewed.       Results for orders placed or performed in visit on 08/16/18   Comprehensive Metabolic Panel   Result Value Ref Range    Glucose 85 65 - 99 mg/dL    BUN 11 6 - 20 mg/dL    Creatinine 0.94 0.57 - 1.00 mg/dL    eGFR Non African Am 62 >60 mL/min/1.73    eGFR   Am 75 >60 mL/min/1.73    BUN/Creatinine Ratio 11.7 7.0 - 25.0    Sodium 139 136 - 145 mmol/L    Potassium 4.3 3.5 - 5.2 mmol/L    Chloride 101 98 - 107 mmol/L    Total CO2 25.4 22.0 - 29.0 mmol/L    Calcium 9.8 8.6 - 10.5 mg/dL    Total Protein 7.5 6.0 - 8.5 g/dL    Albumin 5.10 3.50 - 5.20 g/dL    Globulin 2.4 gm/dL    A/G Ratio 2.1 g/dL    Total Bilirubin 0.4 0.1 - 1.2 mg/dL    Alkaline Phosphatase 69 39 - 117 U/L    AST (SGOT) 15 1 - 32 U/L    ALT (SGPT) 15 1 - 33 U/L   C-Peptide   Result Value Ref Range    C-Peptide 1.9 1.1 - 4.4 ng/mL   Hemoglobin A1c   Result Value Ref Range    Hemoglobin A1C 5.60 4.80 - 5.60 %   Lipid Panel   Result Value Ref Range    Total Cholesterol 120 0 - 200 mg/dL    Triglycerides 56 0 - 150 mg/dL    HDL Cholesterol 59 40 - 60 mg/dL    VLDL Cholesterol 11.2 5 - 40 mg/dL    LDL Cholesterol  50 0 - 100 mg/dL   Uric Acid   Result Value Ref Range    Uric Acid 4.7 2.4 - 5.7 mg/dL   Vitamin D 25 Hydroxy   Result Value Ref Range    25 Hydroxy, Vitamin D 45.5 30.0 - 100.0 ng/ml   T4 & TSH ([facility])   Result Value Ref Range    TSH 0.426 0.270 - 4.200 mIU/mL    T4, Total 7.61 4.50 - 11.70 mcg/dL   Cardiovascular Risk Assessment   Result Value Ref Range    Interpretation Note    Diabetes Patient Education   Result Value Ref Range    PDF Image Not applicable          Assessment/Plan   Diagnoses and all orders for this visit:    Prediabetes  -     T4 & TSH ([facility]); Future  -     Uric Acid; Future  -     Vitamin D 25 Hydroxy; Future  -     Comprehensive Metabolic Panel; Future  -     C-Peptide; Future  -     Hemoglobin A1c; Future  -     Lipid Panel; Future    Generalized obesity  -     T4 & TSH ([facility]); Future  -     Uric Acid; Future  -     Vitamin D 25 Hydroxy; Future  -     Comprehensive Metabolic Panel; Future  -     C-Peptide; Future  -     Hemoglobin A1c; Future  -     Lipid Panel; Future    Vitamin D deficiency disease  -     T4 & TSH ([facility]); Future  -     Uric Acid; Future  -      Vitamin D 25 Hydroxy; Future  -     Comprehensive Metabolic Panel; Future  -     C-Peptide; Future  -     Hemoglobin A1c; Future  -     Lipid Panel; Future    Hyperuricemia  -     T4 & TSH ([facility]); Future  -     Uric Acid; Future  -     Vitamin D 25 Hydroxy; Future  -     Comprehensive Metabolic Panel; Future  -     C-Peptide; Future  -     Hemoglobin A1c; Future  -     Lipid Panel; Future  -     allopurinol (ZYLOPRIM) 100 MG tablet; Take 1 tablet by mouth Daily.    Dyslipidemia    Other orders  -     ergocalciferol (DRISDOL) 94617 units capsule; Take 1 po q week  -     SAXENDA 18 MG/3ML solution pen-injector; 3.0 mg daily             in summary I saw and examined this 58-year-old female for above-mentioned problems.  I reviewed her laboratory evaluation of 08/16/2018 and provided her with a hard copy of it.  She is clinically and metabolically stable and therefore we will go ahead and continue all her current prescriptions.  I will see her in 6 months or sooner if needed with laboratory evaluation prior to each office visit.

## 2018-08-25 DIAGNOSIS — E78.5 DYSLIPIDEMIA: ICD-10-CM

## 2018-08-27 RX ORDER — ROSUVASTATIN CALCIUM 10 MG/1
TABLET, COATED ORAL
Qty: 90 TABLET | Refills: 10 | Status: SHIPPED | OUTPATIENT
Start: 2018-08-27 | End: 2019-04-04 | Stop reason: SDUPTHER

## 2019-02-15 ENCOUNTER — RESULTS ENCOUNTER (OUTPATIENT)
Dept: ENDOCRINOLOGY | Age: 59
End: 2019-02-15

## 2019-02-15 DIAGNOSIS — R73.03 PREDIABETES: ICD-10-CM

## 2019-02-15 DIAGNOSIS — E66.9 GENERALIZED OBESITY: ICD-10-CM

## 2019-02-15 DIAGNOSIS — E79.0 HYPERURICEMIA: ICD-10-CM

## 2019-02-15 DIAGNOSIS — E55.9 VITAMIN D DEFICIENCY DISEASE: ICD-10-CM

## 2019-03-26 RX ORDER — ERGOCALCIFEROL 1.25 MG/1
CAPSULE ORAL
Qty: 13 CAPSULE | Refills: 3 | Status: SHIPPED | OUTPATIENT
Start: 2019-03-26 | End: 2019-04-04 | Stop reason: SDUPTHER

## 2019-03-28 LAB
25(OH)D3+25(OH)D2 SERPL-MCNC: 31.3 NG/ML (ref 30–100)
ALBUMIN SERPL-MCNC: 4.5 G/DL (ref 3.5–5.2)
ALBUMIN/GLOB SERPL: 1.6 G/DL
ALP SERPL-CCNC: 57 U/L (ref 39–117)
ALT SERPL-CCNC: 15 U/L (ref 1–33)
AST SERPL-CCNC: 19 U/L (ref 1–32)
BILIRUB SERPL-MCNC: 0.2 MG/DL (ref 0.2–1.2)
BUN SERPL-MCNC: 14 MG/DL (ref 6–20)
BUN/CREAT SERPL: 15.4 (ref 7–25)
C PEPTIDE SERPL-MCNC: 2.6 NG/ML (ref 1.1–4.4)
CALCIUM SERPL-MCNC: 10.1 MG/DL (ref 8.6–10.5)
CHLORIDE SERPL-SCNC: 104 MMOL/L (ref 98–107)
CHOLEST SERPL-MCNC: 108 MG/DL (ref 0–200)
CO2 SERPL-SCNC: 24.8 MMOL/L (ref 22–29)
CREAT SERPL-MCNC: 0.91 MG/DL (ref 0.57–1)
GLOBULIN SER CALC-MCNC: 2.8 GM/DL
GLUCOSE SERPL-MCNC: 90 MG/DL (ref 65–99)
HBA1C MFR BLD: 5.4 % (ref 4.8–5.6)
HDLC SERPL-MCNC: 58 MG/DL (ref 40–60)
INTERPRETATION: NORMAL
LDLC SERPL CALC-MCNC: 37 MG/DL (ref 0–100)
Lab: NORMAL
POTASSIUM SERPL-SCNC: 4.4 MMOL/L (ref 3.5–5.2)
PROT SERPL-MCNC: 7.3 G/DL (ref 6–8.5)
SODIUM SERPL-SCNC: 142 MMOL/L (ref 136–145)
T4 SERPL-MCNC: 6.48 MCG/DL (ref 4.5–11.7)
TRIGL SERPL-MCNC: 67 MG/DL (ref 0–150)
TSH SERPL DL<=0.005 MIU/L-ACNC: 0.94 MIU/ML (ref 0.27–4.2)
URATE SERPL-MCNC: 5.2 MG/DL (ref 2.4–5.7)
VLDLC SERPL CALC-MCNC: 13.4 MG/DL (ref 5–40)

## 2019-04-04 ENCOUNTER — OFFICE VISIT (OUTPATIENT)
Dept: ENDOCRINOLOGY | Age: 59
End: 2019-04-04

## 2019-04-04 VITALS
DIASTOLIC BLOOD PRESSURE: 78 MMHG | HEIGHT: 66 IN | BODY MASS INDEX: 38.57 KG/M2 | WEIGHT: 240 LBS | RESPIRATION RATE: 16 BRPM | SYSTOLIC BLOOD PRESSURE: 126 MMHG

## 2019-04-04 DIAGNOSIS — E79.0 HYPERURICEMIA: ICD-10-CM

## 2019-04-04 DIAGNOSIS — E78.5 DYSLIPIDEMIA: ICD-10-CM

## 2019-04-04 DIAGNOSIS — R73.03 PREDIABETES: ICD-10-CM

## 2019-04-04 DIAGNOSIS — E66.9 GENERALIZED OBESITY: Primary | ICD-10-CM

## 2019-04-04 DIAGNOSIS — E55.9 VITAMIN D DEFICIENCY DISEASE: ICD-10-CM

## 2019-04-04 PROCEDURE — 99214 OFFICE O/P EST MOD 30 MIN: CPT | Performed by: INTERNAL MEDICINE

## 2019-04-04 RX ORDER — ROSUVASTATIN CALCIUM 10 MG/1
10 TABLET, COATED ORAL
Qty: 90 TABLET | Refills: 3 | Status: SHIPPED | OUTPATIENT
Start: 2019-04-04 | End: 2019-11-21 | Stop reason: SDUPTHER

## 2019-04-04 RX ORDER — ERGOCALCIFEROL 1.25 MG/1
CAPSULE ORAL
Qty: 26 CAPSULE | Refills: 3 | Status: SHIPPED | OUTPATIENT
Start: 2019-04-04 | End: 2019-11-21 | Stop reason: SDUPTHER

## 2019-04-04 RX ORDER — ALLOPURINOL 100 MG/1
100 TABLET ORAL DAILY
Qty: 90 TABLET | Refills: 3 | Status: SHIPPED | OUTPATIENT
Start: 2019-04-04 | End: 2019-11-21 | Stop reason: SDUPTHER

## 2019-04-04 RX ORDER — DULAGLUTIDE 1.5 MG/.5ML
1.5 INJECTION, SOLUTION SUBCUTANEOUS WEEKLY
Qty: 4 PEN | Refills: 5 | Status: SHIPPED | OUTPATIENT
Start: 2019-04-04 | End: 2019-11-21

## 2019-04-04 NOTE — PROGRESS NOTES
"Subjective   Lani Craft is a 58 y.o. female seen for follow up for prediabetes, lab review. Patient states that her insurance has changed and Saxenda is $600 a month. She states that she is having problems with her lower back, ankles, and left knee.     History of Present Illness this is a 58-year-old female known patient with prediabetes and dyslipidemia as well as morbid obesity and hyperuricemia.  Since August 24 which was the date of her last office visit she has gained 23 pounds.    /78   Resp 16   Ht 167.6 cm (66\")   Wt 109 kg (240 lb)   BMI 38.74 kg/m²      No Known Allergies    Current Outpatient Medications:   •  allopurinol (ZYLOPRIM) 100 MG tablet, Take 1 tablet by mouth Daily., Disp: 90 tablet, Rfl: 3  •  Cyanocobalamin (VITAMIN B-12) 1000 MCG/15ML liquid, Take  by mouth., Disp: , Rfl:   •  ergocalciferol (DRISDOL) 92618 units capsule, Take 1 po q week, Disp: 13 capsule, Rfl: 3  •  rosuvastatin (CRESTOR) 10 MG tablet, TAKE ONE TABLET BY MOUTH EVERY NIGHT, Disp: 90 tablet, Rfl: 10  •  SAXENDA 18 MG/3ML solution pen-injector, 3.0 mg daily, Disp: 15 pen, Rfl: 3  •  sertraline (ZOLOFT) 25 MG tablet, Take 25 mg by mouth Daily., Disp: , Rfl:     The following portions of the patient's history were reviewed and updated as appropriate: allergies, current medications, past family history, past medical history, past social history, past surgical history and problem list.    Review of Systems   Constitutional: Negative.    HENT: Negative.    Eyes: Negative.    Respiratory: Negative.    Cardiovascular: Negative.    Gastrointestinal: Negative.    Endocrine: Negative.    Genitourinary: Negative.    Musculoskeletal: Negative.    Skin: Negative.    Allergic/Immunologic: Negative.    Neurological: Negative.    Hematological: Negative.    Psychiatric/Behavioral: Negative.        Objective   Physical Exam   Constitutional: She is oriented to person, place, and time. She appears well-developed and " well-nourished. No distress.   Morbidly obese   HENT:   Head: Normocephalic and atraumatic.   Right Ear: External ear normal.   Left Ear: External ear normal.   Nose: Nose normal.   Mouth/Throat: Oropharynx is clear and moist. No oropharyngeal exudate.   Eyes: Conjunctivae and EOM are normal. Pupils are equal, round, and reactive to light. Right eye exhibits no discharge. Left eye exhibits no discharge. No scleral icterus.   Neck: Normal range of motion. Neck supple. No JVD present. No tracheal deviation present. No thyromegaly present.   Cardiovascular: Normal rate, regular rhythm, normal heart sounds and intact distal pulses. Exam reveals no gallop and no friction rub.   No murmur heard.  Pulmonary/Chest: Effort normal and breath sounds normal. No stridor. No respiratory distress. She has no wheezes. She has no rales. She exhibits no tenderness.   Abdominal: Soft. Bowel sounds are normal. She exhibits no distension and no mass. There is no tenderness. There is no rebound and no guarding. No hernia.   Musculoskeletal: Normal range of motion. She exhibits no edema, tenderness or deformity.   Lymphadenopathy:     She has no cervical adenopathy.   Neurological: She is alert and oriented to person, place, and time. She has normal reflexes. She displays normal reflexes. No cranial nerve deficit or sensory deficit. She exhibits normal muscle tone. Coordination normal.   Skin: Skin is warm and dry. No rash noted. She is not diaphoretic. No erythema. No pallor.   Psychiatric: She has a normal mood and affect. Her behavior is normal. Judgment and thought content normal.   Nursing note reviewed.       Results for orders placed or performed in visit on 02/15/19   T4 & TSH ([facility])   Result Value Ref Range    TSH 0.940 0.270 - 4.200 mIU/mL    T4, Total 6.48 4.50 - 11.70 mcg/dL   Uric Acid   Result Value Ref Range    Uric Acid 5.2 2.4 - 5.7 mg/dL   Vitamin D 25 Hydroxy   Result Value Ref Range    25 Hydroxy, Vitamin D 31.3  30.0 - 100.0 ng/ml   Comprehensive Metabolic Panel   Result Value Ref Range    Glucose 90 65 - 99 mg/dL    BUN 14 6 - 20 mg/dL    Creatinine 0.91 0.57 - 1.00 mg/dL    eGFR Non African Am 64 >60 mL/min/1.73    eGFR African Am 78 >60 mL/min/1.73    BUN/Creatinine Ratio 15.4 7.0 - 25.0    Sodium 142 136 - 145 mmol/L    Potassium 4.4 3.5 - 5.2 mmol/L    Chloride 104 98 - 107 mmol/L    Total CO2 24.8 22.0 - 29.0 mmol/L    Calcium 10.1 8.6 - 10.5 mg/dL    Total Protein 7.3 6.0 - 8.5 g/dL    Albumin 4.50 3.50 - 5.20 g/dL    Globulin 2.8 gm/dL    A/G Ratio 1.6 g/dL    Total Bilirubin 0.2 0.2 - 1.2 mg/dL    Alkaline Phosphatase 57 39 - 117 U/L    AST (SGOT) 19 1 - 32 U/L    ALT (SGPT) 15 1 - 33 U/L   C-Peptide   Result Value Ref Range    C-Peptide 2.6 1.1 - 4.4 ng/mL   Hemoglobin A1c   Result Value Ref Range    Hemoglobin A1C 5.40 4.80 - 5.60 %   Lipid Panel   Result Value Ref Range    Total Cholesterol 108 0 - 200 mg/dL    Triglycerides 67 0 - 150 mg/dL    HDL Cholesterol 58 40 - 60 mg/dL    VLDL Cholesterol 13.4 5 - 40 mg/dL    LDL Cholesterol  37 0 - 100 mg/dL   Cardiovascular Risk Assessment   Result Value Ref Range    Interpretation Note    Diabetes Patient Education   Result Value Ref Range    PDF Image Not applicable          Assessment/Plan   Diagnoses and all orders for this visit:    Generalized obesity  -     T4 & TSH ([facility]); Future  -     Uric Acid; Future  -     Vitamin D 25 Hydroxy; Future  -     Comprehensive Metabolic Panel; Future  -     C-Peptide; Future  -     Hemoglobin A1c; Future  -     Lipid Panel; Future  -     Luteinizing Hormone; Future  -     Follicle Stimulating Hormone; Future    Vitamin D deficiency disease  -     T4 & TSH ([facility]); Future  -     Uric Acid; Future  -     Vitamin D 25 Hydroxy; Future  -     Comprehensive Metabolic Panel; Future  -     C-Peptide; Future  -     Hemoglobin A1c; Future  -     Lipid Panel; Future  -     Luteinizing Hormone; Future  -     Follicle Stimulating  Hormone; Future    Hyperuricemia  -     allopurinol (ZYLOPRIM) 100 MG tablet; Take 1 tablet by mouth Daily.  -     T4 & TSH ([facility]); Future  -     Uric Acid; Future  -     Vitamin D 25 Hydroxy; Future  -     Comprehensive Metabolic Panel; Future  -     C-Peptide; Future  -     Hemoglobin A1c; Future  -     Lipid Panel; Future  -     Luteinizing Hormone; Future  -     Follicle Stimulating Hormone; Future    Prediabetes  -     T4 & TSH ([facility]); Future  -     Uric Acid; Future  -     Vitamin D 25 Hydroxy; Future  -     Comprehensive Metabolic Panel; Future  -     C-Peptide; Future  -     Hemoglobin A1c; Future  -     Lipid Panel; Future  -     Luteinizing Hormone; Future  -     Follicle Stimulating Hormone; Future    Dyslipidemia  -     rosuvastatin (CRESTOR) 10 MG tablet; Take 1 tablet by mouth every night at bedtime.  -     T4 & TSH ([facility]); Future  -     Uric Acid; Future  -     Vitamin D 25 Hydroxy; Future  -     Comprehensive Metabolic Panel; Future  -     C-Peptide; Future  -     Hemoglobin A1c; Future  -     Lipid Panel; Future  -     Luteinizing Hormone; Future  -     Follicle Stimulating Hormone; Future    Other orders  -     ergocalciferol (DRISDOL) 13299 units capsule; Take 1 po twice a day week  -     TRULICITY 1.5 MG/0.5ML solution pen-injector; Inject 1.5 mg under the skin into the appropriate area as directed 1 (One) Time Per Week.             In summary I saw and examined this 58-year-old female for above-mentioned problems.  The reviewed her laboratory evaluation of March 27, 2019 and provided her with a hard copy of it.  Overall she is clinically and metabolically stable therefore we will go ahead and continue her current prescriptions.  Because of prohibitive cost of Saxenda I am going to go ahead and switch her to try Trulicity 1.5 mg weekly.I will see her in 6 months with labs.

## 2019-04-08 RX ORDER — SERTRALINE HYDROCHLORIDE 25 MG/1
25 TABLET, FILM COATED ORAL DAILY
Qty: 90 TABLET | Refills: 3 | Status: SHIPPED | OUTPATIENT
Start: 2019-04-08 | End: 2020-07-24 | Stop reason: SDUPTHER

## 2019-04-10 ENCOUNTER — PRIOR AUTHORIZATION (OUTPATIENT)
Dept: ENDOCRINOLOGY | Age: 59
End: 2019-04-10

## 2019-09-23 ENCOUNTER — RESULTS ENCOUNTER (OUTPATIENT)
Dept: ENDOCRINOLOGY | Age: 59
End: 2019-09-23

## 2019-09-23 DIAGNOSIS — E66.9 GENERALIZED OBESITY: ICD-10-CM

## 2019-09-23 DIAGNOSIS — R73.03 PREDIABETES: ICD-10-CM

## 2019-09-23 DIAGNOSIS — E55.9 VITAMIN D DEFICIENCY DISEASE: ICD-10-CM

## 2019-09-23 DIAGNOSIS — E78.5 DYSLIPIDEMIA: ICD-10-CM

## 2019-09-23 DIAGNOSIS — E79.0 HYPERURICEMIA: ICD-10-CM

## 2019-11-16 LAB
25(OH)D3+25(OH)D2 SERPL-MCNC: 64 NG/ML (ref 30–100)
ALBUMIN SERPL-MCNC: 4.8 G/DL (ref 3.5–5.2)
ALBUMIN/GLOB SERPL: 1.8 G/DL
ALP SERPL-CCNC: 74 U/L (ref 39–117)
ALT SERPL-CCNC: 21 U/L (ref 1–33)
AST SERPL-CCNC: 25 U/L (ref 1–32)
BILIRUB SERPL-MCNC: 0.2 MG/DL (ref 0.2–1.2)
BUN SERPL-MCNC: 14 MG/DL (ref 6–20)
BUN/CREAT SERPL: 16.7 (ref 7–25)
C PEPTIDE SERPL-MCNC: 3.1 NG/ML (ref 1.1–4.4)
CALCIUM SERPL-MCNC: 9.7 MG/DL (ref 8.6–10.5)
CHLORIDE SERPL-SCNC: 102 MMOL/L (ref 98–107)
CHOLEST SERPL-MCNC: 137 MG/DL (ref 0–200)
CO2 SERPL-SCNC: 24.4 MMOL/L (ref 22–29)
CREAT SERPL-MCNC: 0.84 MG/DL (ref 0.57–1)
FSH SERPL-ACNC: 55.7 MIU/ML
GLOBULIN SER CALC-MCNC: 2.6 GM/DL
GLUCOSE SERPL-MCNC: 99 MG/DL (ref 65–99)
HBA1C MFR BLD: 5.8 % (ref 4.8–5.6)
HDLC SERPL-MCNC: 56 MG/DL (ref 40–60)
INTERPRETATION: NORMAL
LDLC SERPL CALC-MCNC: 63 MG/DL (ref 0–100)
LH SERPL-ACNC: 49.6 MIU/ML
Lab: NORMAL
POTASSIUM SERPL-SCNC: 4.4 MMOL/L (ref 3.5–5.2)
PROT SERPL-MCNC: 7.4 G/DL (ref 6–8.5)
SODIUM SERPL-SCNC: 142 MMOL/L (ref 136–145)
T4 SERPL-MCNC: 6.62 MCG/DL (ref 4.5–11.7)
TRIGL SERPL-MCNC: 88 MG/DL (ref 0–150)
TSH SERPL DL<=0.005 MIU/L-ACNC: 0.85 UIU/ML (ref 0.27–4.2)
URATE SERPL-MCNC: 5.1 MG/DL (ref 2.4–5.7)
VLDLC SERPL CALC-MCNC: 17.6 MG/DL

## 2019-11-21 ENCOUNTER — OFFICE VISIT (OUTPATIENT)
Dept: ENDOCRINOLOGY | Age: 59
End: 2019-11-21

## 2019-11-21 VITALS
HEIGHT: 66 IN | BODY MASS INDEX: 40.15 KG/M2 | SYSTOLIC BLOOD PRESSURE: 118 MMHG | DIASTOLIC BLOOD PRESSURE: 64 MMHG | WEIGHT: 249.8 LBS

## 2019-11-21 DIAGNOSIS — E79.0 HYPERURICEMIA: ICD-10-CM

## 2019-11-21 DIAGNOSIS — E66.01 CLASS 3 SEVERE OBESITY WITHOUT SERIOUS COMORBIDITY WITH BODY MASS INDEX (BMI) OF 40.0 TO 44.9 IN ADULT, UNSPECIFIED OBESITY TYPE (HCC): Primary | ICD-10-CM

## 2019-11-21 DIAGNOSIS — Z78.0 MENOPAUSE PRESENT: ICD-10-CM

## 2019-11-21 DIAGNOSIS — E55.9 VITAMIN D DEFICIENCY DISEASE: ICD-10-CM

## 2019-11-21 DIAGNOSIS — E78.5 DYSLIPIDEMIA: ICD-10-CM

## 2019-11-21 DIAGNOSIS — R73.03 PREDIABETES: ICD-10-CM

## 2019-11-21 PROCEDURE — 99214 OFFICE O/P EST MOD 30 MIN: CPT | Performed by: INTERNAL MEDICINE

## 2019-11-21 RX ORDER — AMMONIUM LACTATE 12 G/100G
LOTION TOPICAL
Qty: 567 G | Refills: 5 | Status: SHIPPED | OUTPATIENT
Start: 2019-11-21 | End: 2020-11-20

## 2019-11-21 RX ORDER — LIRAGLUTIDE 6 MG/ML
3 INJECTION, SOLUTION SUBCUTANEOUS DAILY
Qty: 5 PEN | Refills: 11 | Status: SHIPPED | OUTPATIENT
Start: 2019-11-21 | End: 2020-08-04 | Stop reason: SDUPTHER

## 2019-11-21 RX ORDER — ROSUVASTATIN CALCIUM 10 MG/1
10 TABLET, COATED ORAL
Qty: 90 TABLET | Refills: 3 | Status: SHIPPED | OUTPATIENT
Start: 2019-11-21 | End: 2020-07-24 | Stop reason: SDUPTHER

## 2019-11-21 RX ORDER — NEEDLES, DISPOSABLE 25GX5/8"
NEEDLE, DISPOSABLE MISCELLANEOUS
Qty: 100 EACH | Refills: 3 | Status: SHIPPED | OUTPATIENT
Start: 2019-11-21

## 2019-11-21 RX ORDER — ERGOCALCIFEROL 1.25 MG/1
CAPSULE ORAL
Qty: 26 CAPSULE | Refills: 3 | Status: SHIPPED | OUTPATIENT
Start: 2019-11-21 | End: 2020-08-04 | Stop reason: SDUPTHER

## 2019-11-21 RX ORDER — ALLOPURINOL 100 MG/1
100 TABLET ORAL DAILY
Qty: 90 TABLET | Refills: 3 | Status: SHIPPED | OUTPATIENT
Start: 2019-11-21 | End: 2020-07-24 | Stop reason: SDUPTHER

## 2019-11-21 NOTE — PROGRESS NOTES
"Subjective   Lani Craft is a 59 y.o. female seen for follow up for prediabetes, lab review. Still having weight problems    /64   Ht 167.6 cm (66\")   Wt 113 kg (249 lb 12.8 oz)   BMI 40.32 kg/m²     No Known Allergies      Current Outpatient Medications:   •  allopurinol (ZYLOPRIM) 100 MG tablet, Take 1 tablet by mouth Daily., Disp: 90 tablet, Rfl: 3  •  Cyanocobalamin (VITAMIN B-12) 1000 MCG/15ML liquid, Take  by mouth., Disp: , Rfl:   •  ergocalciferol (DRISDOL) 18756 units capsule, Take 1 po twice a day week, Disp: 26 capsule, Rfl: 3  •  rosuvastatin (CRESTOR) 10 MG tablet, Take 1 tablet by mouth every night at bedtime., Disp: 90 tablet, Rfl: 3  •  sertraline (ZOLOFT) 25 MG tablet, Take 1 tablet by mouth Daily., Disp: 90 tablet, Rfl: 3  •  TRULICITY 1.5 MG/0.5ML solution pen-injector, Inject 1.5 mg under the skin into the appropriate area as directed 1 (One) Time Per Week., Disp: 4 pen, Rfl: 5    History of Present Illness this is a 59-year-old female known patient with prediabetes and obesity as well as hypertension and dyslipidemia with hyperuricemia and vitamin D deficiency.  Over the course of last 6 months she has had no significant health problem for which to go to the emergency room or hospital however she has gained 9 pounds since 4/4/2019 office visit.  She said she does not get the same feeling taking Trulicity as far as curtailing her appetite as when she was on Saxenda and was not effectively losing weight.  Also she is complaining of dry skin.    The following portions of the patient's history were reviewed and updated as appropriate: allergies, current medications, past family history, past medical history, past social history, past surgical history and problem list.    Review of Systems   Constitutional: Negative.    HENT: Negative.    Eyes: Negative.    Respiratory: Negative.    Cardiovascular: Negative.    Gastrointestinal: Negative.    Endocrine: Negative.    Genitourinary: Negative.  "   Musculoskeletal: Negative.    Skin: Negative.    Allergic/Immunologic: Negative.    Neurological: Negative.    Hematological: Negative.    Psychiatric/Behavioral: Negative.    The above-mentioned review of system were reviewed, corroborated and accepted.    Objective      Lab Results   Component Value Date    BUN 14 11/15/2019    CREATININE 0.84 11/15/2019    EGFRIFNONA 70 11/15/2019    EGFRIFAFRI 85 11/15/2019    BCR 16.7 11/15/2019    K 4.4 11/15/2019    CO2 24.4 11/15/2019    CALCIUM 9.7 11/15/2019    PROTENTOTREF 7.4 11/15/2019    ALBUMIN 4.80 11/15/2019    LABIL2 1.8 11/15/2019    AST 25 11/15/2019    ALT 21 11/15/2019     Lab Results   Component Value Date    HGBA1C 5.80 (H) 11/15/2019       Lab Results   Component Value Date    TSH 0.846 11/15/2019       Lab Results   Component Value Date    TSH 0.846 11/15/2019         Physical Exam   Constitutional: She is oriented to person, place, and time. She appears well-developed and well-nourished. No distress.   Morbidly obese   HENT:   Head: Normocephalic and atraumatic.   Right Ear: External ear normal.   Left Ear: External ear normal.   Nose: Nose normal.   Mouth/Throat: Oropharynx is clear and moist. No oropharyngeal exudate.   Eyes: Conjunctivae and EOM are normal. Pupils are equal, round, and reactive to light. Right eye exhibits no discharge. Left eye exhibits no discharge. No scleral icterus.   Neck: Normal range of motion. Neck supple. No JVD present. No tracheal deviation present. No thyromegaly present.   Cardiovascular: Normal rate, regular rhythm, normal heart sounds and intact distal pulses. Exam reveals no gallop and no friction rub.   No murmur heard.  Pulmonary/Chest: Effort normal and breath sounds normal. No stridor. No respiratory distress. She has no wheezes. She has no rales. She exhibits no tenderness.   Abdominal: Soft. Bowel sounds are normal. She exhibits no distension and no mass. There is no tenderness. There is no rebound and no  guarding. No hernia.   Musculoskeletal: Normal range of motion. She exhibits no edema, tenderness or deformity.   Lymphadenopathy:     She has no cervical adenopathy.   Neurological: She is alert and oriented to person, place, and time. She has normal reflexes. She displays normal reflexes. No cranial nerve deficit or sensory deficit. She exhibits normal muscle tone. Coordination normal.   Skin: Skin is warm and dry. No rash noted. She is not diaphoretic. No erythema. No pallor.   Psychiatric: She has a normal mood and affect. Her behavior is normal. Judgment and thought content normal.   Nursing note reviewed.  No significant change since 4/4/2019 office visit.      Assessment/Plan   Diagnoses and all orders for this visit:    Class 3 severe obesity without serious comorbidity with body mass index (BMI) of 40.0 to 44.9 in adult, unspecified obesity type (CMS/HCC)  -     T4 & TSH ([facility]); Future  -     T3, Free; Future  -     T4, Free; Future  -     Uric Acid; Future  -     Vitamin D 25 Hydroxy; Future  -     Comprehensive Metabolic Panel; Future  -     C-Peptide; Future  -     Hemoglobin A1c; Future  -     NMR LipoProfile; Future    Prediabetes  -     T4 & TSH ([facility]); Future  -     T3, Free; Future  -     T4, Free; Future  -     Uric Acid; Future  -     Vitamin D 25 Hydroxy; Future  -     Comprehensive Metabolic Panel; Future  -     C-Peptide; Future  -     Hemoglobin A1c; Future  -     NMR LipoProfile; Future    Hyperuricemia  -     allopurinol (ZYLOPRIM) 100 MG tablet; Take 1 tablet by mouth Daily.  -     T4 & TSH ([facility]); Future  -     T3, Free; Future  -     T4, Free; Future  -     Uric Acid; Future  -     Vitamin D 25 Hydroxy; Future  -     Comprehensive Metabolic Panel; Future  -     C-Peptide; Future  -     Hemoglobin A1c; Future  -     NMR LipoProfile; Future    Dyslipidemia  -     rosuvastatin (CRESTOR) 10 MG tablet; Take 1 tablet by mouth every night at bedtime.  -     T4 & TSH ([facility]); Future  -  "    T3, Free; Future  -     T4, Free; Future  -     Uric Acid; Future  -     Vitamin D 25 Hydroxy; Future  -     Comprehensive Metabolic Panel; Future  -     C-Peptide; Future  -     Hemoglobin A1c; Future  -     NMR LipoProfile; Future    Vitamin D deficiency disease  -     T4 & TSH ([facility]); Future  -     T3, Free; Future  -     T4, Free; Future  -     Uric Acid; Future  -     Vitamin D 25 Hydroxy; Future  -     Comprehensive Metabolic Panel; Future  -     C-Peptide; Future  -     Hemoglobin A1c; Future  -     NMR LipoProfile; Future    Menopause present  -     T4 & TSH ([facility]); Future  -     T3, Free; Future  -     T4, Free; Future  -     Uric Acid; Future  -     Vitamin D 25 Hydroxy; Future  -     Comprehensive Metabolic Panel; Future  -     C-Peptide; Future  -     Hemoglobin A1c; Future  -     NMR LipoProfile; Future    Other orders  -     SAXENDA 18 MG/3ML solution pen-injector; Inject 3 mg under the skin into the appropriate area as directed Daily.  -     ergocalciferol (DRISDOL) 1.25 MG (90755 UT) capsule; Take 1 po twice a day week  -     ammonium lactate (AMLACTIN) 12 % lotion; Apply to affected area daily  -     BD DISP NEEDLES 30G X 1/2\" misc; Use daily for injection of Saxenda.      Is summary I saw and examined this 59-year-old female for above-mentioned problems.  The reviewed her laboratory evaluation of 11/15/2019 and provided her with a hard copy of it.  Overall she is clinically and metabolically stable and therefore we will continue her current prescriptions.  At this time however we are going to discontinue Trulicity and get her back on Saxenda she mentioned that as of the first of the year she will have a new insurance which more than likely will pay for the higher cost.  I will see her in 6 months or sooner if needed with laboratory evaluation prior to each office visit.           "

## 2020-05-07 ENCOUNTER — RESULTS ENCOUNTER (OUTPATIENT)
Dept: ENDOCRINOLOGY | Age: 60
End: 2020-05-07

## 2020-05-07 DIAGNOSIS — E78.5 DYSLIPIDEMIA: ICD-10-CM

## 2020-05-07 DIAGNOSIS — E66.01 CLASS 3 SEVERE OBESITY WITHOUT SERIOUS COMORBIDITY WITH BODY MASS INDEX (BMI) OF 40.0 TO 44.9 IN ADULT, UNSPECIFIED OBESITY TYPE (HCC): ICD-10-CM

## 2020-05-07 DIAGNOSIS — R73.03 PREDIABETES: ICD-10-CM

## 2020-05-07 DIAGNOSIS — E55.9 VITAMIN D DEFICIENCY DISEASE: ICD-10-CM

## 2020-05-07 DIAGNOSIS — Z78.0 MENOPAUSE PRESENT: ICD-10-CM

## 2020-05-07 DIAGNOSIS — E79.0 HYPERURICEMIA: ICD-10-CM

## 2020-07-22 LAB
25(OH)D3+25(OH)D2 SERPL-MCNC: 71.3 NG/ML (ref 30–100)
ALBUMIN SERPL-MCNC: 4.7 G/DL (ref 3.5–5.2)
ALBUMIN/GLOB SERPL: 1.8 G/DL
ALP SERPL-CCNC: 74 U/L (ref 39–117)
ALT SERPL-CCNC: 68 U/L (ref 1–33)
AST SERPL-CCNC: 33 U/L (ref 1–32)
BILIRUB SERPL-MCNC: 0.4 MG/DL (ref 0–1.2)
BUN SERPL-MCNC: 16 MG/DL (ref 6–20)
BUN/CREAT SERPL: 15.2 (ref 7–25)
C PEPTIDE SERPL-MCNC: 4 NG/ML (ref 1.1–4.4)
CALCIUM SERPL-MCNC: 9.9 MG/DL (ref 8.6–10.5)
CHLORIDE SERPL-SCNC: 104 MMOL/L (ref 98–107)
CHOLEST SERPL-MCNC: 138 MG/DL (ref 100–199)
CO2 SERPL-SCNC: 26.4 MMOL/L (ref 22–29)
CREAT SERPL-MCNC: 1.05 MG/DL (ref 0.57–1)
GLOBULIN SER CALC-MCNC: 2.6 GM/DL
GLUCOSE SERPL-MCNC: 97 MG/DL (ref 65–99)
HBA1C MFR BLD: 5.7 % (ref 4.8–5.6)
HDL SERPL-SCNC: 36.2 UMOL/L
HDLC SERPL-MCNC: 53 MG/DL
LDL SERPL QN: 20.6 NM
LDL SERPL-SCNC: 869 NMOL/L
LDL SMALL SERPL-SCNC: 487 NMOL/L
LDLC SERPL CALC-MCNC: 64 MG/DL (ref 0–99)
POTASSIUM SERPL-SCNC: 4.6 MMOL/L (ref 3.5–5.2)
PROT SERPL-MCNC: 7.3 G/DL (ref 6–8.5)
SODIUM SERPL-SCNC: 140 MMOL/L (ref 136–145)
T3FREE SERPL-MCNC: 3.1 PG/ML (ref 2–4.4)
T4 FREE SERPL-MCNC: 1.1 NG/DL (ref 0.93–1.7)
T4 SERPL-MCNC: 6.73 MCG/DL (ref 4.5–11.7)
TRIGL SERPL-MCNC: 105 MG/DL (ref 0–149)
TSH SERPL DL<=0.005 MIU/L-ACNC: 0.86 UIU/ML (ref 0.27–4.2)
URATE SERPL-MCNC: 5.1 MG/DL (ref 2.4–5.7)

## 2020-07-24 DIAGNOSIS — E78.5 DYSLIPIDEMIA: ICD-10-CM

## 2020-07-24 DIAGNOSIS — E79.0 HYPERURICEMIA: ICD-10-CM

## 2020-07-24 RX ORDER — SERTRALINE HYDROCHLORIDE 25 MG/1
25 TABLET, FILM COATED ORAL DAILY
Qty: 90 TABLET | Refills: 3 | Status: SHIPPED | OUTPATIENT
Start: 2020-07-24 | End: 2021-10-22 | Stop reason: SDUPTHER

## 2020-07-24 RX ORDER — ROSUVASTATIN CALCIUM 10 MG/1
10 TABLET, COATED ORAL
Qty: 90 TABLET | Refills: 3 | Status: SHIPPED | OUTPATIENT
Start: 2020-07-24 | End: 2020-08-04 | Stop reason: SDUPTHER

## 2020-07-24 RX ORDER — ALLOPURINOL 100 MG/1
100 TABLET ORAL DAILY
Qty: 90 TABLET | Refills: 3 | Status: SHIPPED | OUTPATIENT
Start: 2020-07-24 | End: 2020-08-04 | Stop reason: SDUPTHER

## 2020-08-04 ENCOUNTER — OFFICE VISIT (OUTPATIENT)
Dept: ENDOCRINOLOGY | Age: 60
End: 2020-08-04

## 2020-08-04 VITALS
WEIGHT: 244 LBS | BODY MASS INDEX: 39.21 KG/M2 | HEIGHT: 66 IN | RESPIRATION RATE: 20 BRPM | SYSTOLIC BLOOD PRESSURE: 134 MMHG | DIASTOLIC BLOOD PRESSURE: 66 MMHG | OXYGEN SATURATION: 98 %

## 2020-08-04 DIAGNOSIS — E78.5 DYSLIPIDEMIA: ICD-10-CM

## 2020-08-04 DIAGNOSIS — Z78.0 MENOPAUSE PRESENT: ICD-10-CM

## 2020-08-04 DIAGNOSIS — R73.03 PREDIABETES: Primary | ICD-10-CM

## 2020-08-04 DIAGNOSIS — E79.0 HYPERURICEMIA: ICD-10-CM

## 2020-08-04 DIAGNOSIS — E55.9 VITAMIN D DEFICIENCY DISEASE: ICD-10-CM

## 2020-08-04 DIAGNOSIS — E66.9 GENERALIZED OBESITY: ICD-10-CM

## 2020-08-04 PROCEDURE — 99214 OFFICE O/P EST MOD 30 MIN: CPT | Performed by: INTERNAL MEDICINE

## 2020-08-04 RX ORDER — FLUTICASONE PROPIONATE 50 MCG
2 SPRAY, SUSPENSION (ML) NASAL DAILY
COMMUNITY

## 2020-08-04 RX ORDER — LIRAGLUTIDE 6 MG/ML
3 INJECTION, SOLUTION SUBCUTANEOUS DAILY
Qty: 15 PEN | Refills: 3 | Status: SHIPPED | OUTPATIENT
Start: 2020-08-04 | End: 2021-01-15 | Stop reason: SDUPTHER

## 2020-08-04 RX ORDER — MONTELUKAST SODIUM 10 MG/1
10 TABLET ORAL NIGHTLY
COMMUNITY
End: 2022-11-17

## 2020-08-04 RX ORDER — ERGOCALCIFEROL 1.25 MG/1
CAPSULE ORAL
Qty: 26 CAPSULE | Refills: 3 | Status: SHIPPED | OUTPATIENT
Start: 2020-08-04 | End: 2021-03-10 | Stop reason: SDUPTHER

## 2020-08-04 RX ORDER — FEXOFENADINE HCL 180 MG/1
180 TABLET ORAL DAILY
COMMUNITY

## 2020-08-04 RX ORDER — ALLOPURINOL 100 MG/1
100 TABLET ORAL DAILY
Qty: 90 TABLET | Refills: 3 | Status: SHIPPED | OUTPATIENT
Start: 2020-08-04 | End: 2021-08-04

## 2020-08-04 RX ORDER — ROSUVASTATIN CALCIUM 10 MG/1
10 TABLET, COATED ORAL
Qty: 90 TABLET | Refills: 3 | Status: SHIPPED | OUTPATIENT
Start: 2020-08-04 | End: 2021-10-22 | Stop reason: SDUPTHER

## 2020-12-23 RX ORDER — LIRAGLUTIDE 6 MG/ML
3 INJECTION, SOLUTION SUBCUTANEOUS DAILY
Refills: 10 | OUTPATIENT
Start: 2020-12-23

## 2021-01-15 RX ORDER — LIRAGLUTIDE 6 MG/ML
3 INJECTION, SOLUTION SUBCUTANEOUS DAILY
Qty: 15 PEN | Refills: 3 | Status: SHIPPED | OUTPATIENT
Start: 2021-01-15 | End: 2021-11-15 | Stop reason: SDUPTHER

## 2021-01-15 NOTE — TELEPHONE ENCOUNTER
PT HAS APPOINTMENT SET UP WITH [facility] IS IN NEED OF A MED REFILL FOR SAXENDA 18 MG/3ML solution pen-injector [[id number]] (Order [id number])    CAN BE SENT TO PHARM   [facility]  [facility] [place] [place] [facility] [id number] [place] [address] AT [facility] - [phone] PH - [phone] FX   Phone:  [phone]   Fax:  [phone]   Address:  [id number] [place] [address]   Pharmacy Comments: --

## 2021-03-01 ENCOUNTER — LAB (OUTPATIENT)
Dept: ENDOCRINOLOGY | Age: 61
End: 2021-03-01

## 2021-03-01 DIAGNOSIS — R73.03 PREDIABETES: ICD-10-CM

## 2021-03-01 DIAGNOSIS — E66.9 GENERALIZED OBESITY: ICD-10-CM

## 2021-03-01 DIAGNOSIS — E55.9 VITAMIN D DEFICIENCY DISEASE: ICD-10-CM

## 2021-03-01 DIAGNOSIS — R73.03 PREDIABETES: Primary | ICD-10-CM

## 2021-03-01 DIAGNOSIS — E78.5 DYSLIPIDEMIA: ICD-10-CM

## 2021-03-02 LAB
25(OH)D3+25(OH)D2 SERPL-MCNC: 50.6 NG/ML (ref 30–100)
ALBUMIN SERPL-MCNC: 3.9 G/DL (ref 3.5–5.2)
ALBUMIN/CREAT UR: 3 MG/G CREAT (ref 0–29)
ALBUMIN/GLOB SERPL: 1.4 G/DL
ALP SERPL-CCNC: 88 U/L (ref 39–117)
ALT SERPL-CCNC: 20 U/L (ref 1–33)
AST SERPL-CCNC: 20 U/L (ref 1–32)
BILIRUB SERPL-MCNC: 0.2 MG/DL (ref 0–1.2)
BUN SERPL-MCNC: 12 MG/DL (ref 6–20)
BUN/CREAT SERPL: 12.9 (ref 7–25)
C PEPTIDE SERPL-MCNC: 3.2 NG/ML (ref 1.1–4.4)
CALCIUM SERPL-MCNC: 9.4 MG/DL (ref 8.6–10.5)
CHLORIDE SERPL-SCNC: 106 MMOL/L (ref 98–107)
CHOLEST SERPL-MCNC: 173 MG/DL (ref 0–200)
CO2 SERPL-SCNC: 23.4 MMOL/L (ref 22–29)
CREAT SERPL-MCNC: 0.93 MG/DL (ref 0.57–1)
CREAT UR-MCNC: 321 MG/DL
FT4I SERPL CALC-MCNC: 1.4 (ref 1.2–4.9)
GLOBULIN SER CALC-MCNC: 2.8 GM/DL
GLUCOSE SERPL-MCNC: 107 MG/DL (ref 65–99)
HBA1C MFR BLD: 5.7 % (ref 4.8–5.6)
HDLC SERPL-MCNC: 46 MG/DL (ref 40–60)
IMP & REVIEW OF LAB RESULTS: NORMAL
LDLC SERPL CALC-MCNC: 100 MG/DL (ref 0–100)
Lab: NORMAL
MICROALBUMIN UR-MCNC: 10.8 UG/ML
POTASSIUM SERPL-SCNC: 4.3 MMOL/L (ref 3.5–5.2)
PROT SERPL-MCNC: 6.7 G/DL (ref 6–8.5)
SODIUM SERPL-SCNC: 141 MMOL/L (ref 136–145)
T3FREE SERPL-MCNC: 2.9 PG/ML (ref 2–4.4)
T3RU NFR SERPL: 23 % (ref 24–39)
T4 FREE SERPL-MCNC: 1.01 NG/DL (ref 0.93–1.7)
T4 SERPL-MCNC: 6 UG/DL (ref 4.5–12)
THYROGLOB AB SERPL-ACNC: <1 IU/ML (ref 0–0.9)
THYROPEROXIDASE AB SERPL-ACNC: <9 IU/ML (ref 0–34)
TRIGL SERPL-MCNC: 153 MG/DL (ref 0–150)
TSH SERPL DL<=0.005 MIU/L-ACNC: 1.39 UIU/ML (ref 0.45–4.5)
VLDLC SERPL CALC-MCNC: 27 MG/DL (ref 5–40)

## 2021-03-10 ENCOUNTER — OFFICE VISIT (OUTPATIENT)
Dept: ENDOCRINOLOGY | Age: 61
End: 2021-03-10

## 2021-03-10 VITALS
SYSTOLIC BLOOD PRESSURE: 124 MMHG | DIASTOLIC BLOOD PRESSURE: 70 MMHG | HEIGHT: 66 IN | BODY MASS INDEX: 37.7 KG/M2 | WEIGHT: 234.6 LBS

## 2021-03-10 DIAGNOSIS — E04.2 MULTIPLE THYROID NODULES: ICD-10-CM

## 2021-03-10 DIAGNOSIS — R73.03 PREDIABETES: ICD-10-CM

## 2021-03-10 DIAGNOSIS — E78.5 DYSLIPIDEMIA: ICD-10-CM

## 2021-03-10 DIAGNOSIS — E66.9 GENERALIZED OBESITY: ICD-10-CM

## 2021-03-10 PROCEDURE — 99214 OFFICE O/P EST MOD 30 MIN: CPT | Performed by: INTERNAL MEDICINE

## 2021-03-10 RX ORDER — ERGOCALCIFEROL 1.25 MG/1
CAPSULE ORAL
Qty: 26 CAPSULE | Refills: 3 | Status: SHIPPED | OUTPATIENT
Start: 2021-03-10 | End: 2021-11-15 | Stop reason: SDUPTHER

## 2021-03-10 NOTE — PROGRESS NOTES
"Chief Complaint  Prediabetes (former Dr Duran patient, follow up, weight issue,  wants her to get something for sex drive )    Subjective          Lani Craft presents to [facility] ENDOCRINOLOGY  59 yo F with PMH of obesity, prediabetes, and thyroid nodules who presents for follow up.    Prediabetes  Most recent hgb A1c is 5.7. Patient is compliant with Saxenda 3mg sq daily. She notes significant benefit in her hgb A1c and her weight in 2018 with no significant benefit with restarting the medication in 2020.      Obesity  Patient notes that she has been on multiple medications in the past for her weight management. She notes that she was on Saxenda in 2018, had to stop due to changes in her insurance, In 202 her insurance changes and she was allowed to start the mediation again. She notes that she continues to have cravings for sweets and not noticing significant weight changes. She is also on an arobonne cleanse for the last 30 days and has not had much benefit. She notes that she has not seen much benefit in her weight. She added extra fiber in her diet to help with constipation    Thyroid nodule  Patient was found to have a multinodular thyroid on ultrasound several years ago. Last thryoid ultrasound was completed in 2017 and showed 1.4 cm hypoechoic thyroid nodule in the right upper erin of the thyroid. Patient denies any hx of biopsy. Most recent thyroid function tests were in normal range.            Objective   Vital Signs:   /70   Ht 167.6 cm (66\")   Wt 106 kg (234 lb 9.6 oz)   BMI 37.87 kg/m²     Physical Exam  Vitals reviewed.   Constitutional:       Appearance: Normal appearance. She is normal weight.   HENT:      Mouth/Throat:      Mouth: Mucous membranes are moist.   Eyes:      Extraocular Movements: Extraocular movements intact.      Conjunctiva/sclera: Conjunctivae normal.      Pupils: Pupils are equal, round, and reactive to light.   Neck:      Thyroid: No " thyromegaly or thyroid tenderness.   Cardiovascular:      Rate and Rhythm: Normal rate and regular rhythm.      Pulses: Normal pulses.      Heart sounds: Normal heart sounds.   Abdominal:      General: Abdomen is flat. Bowel sounds are normal.      Palpations: Abdomen is soft.   Skin:     General: Skin is warm and dry.   Neurological:      General: No focal deficit present.      Mental Status: She is oriented to person, place, and time.      Cranial Nerves: Cranial nerves are intact.      Sensory: Sensation is intact.      Deep Tendon Reflexes: Reflexes are normal and symmetric.   Psychiatric:         Mood and Affect: Mood normal.         Behavior: Behavior normal.        Result Review :   The following data was reviewed by: Hui Vázquez MD on 03/10/2021:  CMP    CMP 7/21/20 3/1/21   Glucose 97 107 (A)   BUN 16 12   Creatinine 1.05 (A) 0.93   eGFR Non  Am 54 (A) 62   eGFR  Am 65 75   Sodium 140 141   Potassium 4.6 4.3   Chloride 104 106   Calcium 9.9 9.4   Total Protein 7.3 6.7   Albumin 4.70 3.90   Globulin 2.6 2.8   Total Bilirubin 0.4 0.2   Alkaline Phosphatase 74 88   AST (SGOT) 33 (A) 20   ALT (SGPT) 68 (A) 20   (A) Abnormal value       Comments are available for some flowsheets but are not being displayed.             Lipid Panel    Lipid Panel 7/21/20 3/1/21   Total Cholesterol 138 173   Triglycerides 105 153 (A)   HDL Cholesterol  46   VLDL Cholesterol  27   LDL Cholesterol   100   (A) Abnormal value       Comments are available for some flowsheets but are not being displayed.           TSH    TSH 7/21/20 3/1/21   TSH 0.856 1.390           Electrolytes    Electrolytes 7/21/20 3/1/21   Sodium 140 141   Potassium 4.6 4.3   Chloride 104 106   Calcium 9.9 9.4           Renal Profile    Renal Profile 7/21/20 3/1/21   BUN 16 12   Creatinine 1.05 (A) 0.93   eGFR Non  Am 54 (A) 62   eGFR  Am 65 75   (A) Abnormal value       Comments are available for some flowsheets but are not being  displayed.           A1C Last 3 Results    HGBA1C Last 3 Results 7/21/20 3/1/21   Hemoglobin A1C 5.70 (A) 5.70 (A)   (A) Abnormal value       Comments are available for some flowsheets but are not being displayed.           Microalbumin    Microalbumin 3/1/21   Microalbumin, Urine 10.8                   Data reviewed: TSH, free T4, CMP, hgb A1c          Assessment and Plan    Diagnoses and all orders for this visit:    1. Dyslipidemia  Assessment & Plan:  Lipid Panel    Lipid Panel 7/21/20 3/1/21   Total Cholesterol 138 173   Triglycerides 105 153 (A)   HDL Cholesterol  46   VLDL Cholesterol  27   LDL Cholesterol   100   (A) Abnormal value       Comments are available for some flowsheets but are not being displayed.         Continue Crestor 10 mg po qd      2. Prediabetes  Assessment & Plan:  Goal Hgb A1c <7  Goal fasting BG <130 mg/dl  Goal BG prior to meal <180  Most recent hgb A1c was   Hemoglobin A1C   Date Value Ref Range Status   03/01/2021 5.70 (H) 4.80 - 5.60 % Final     Comment:     Hemoglobin A1C Ranges:  Increased Risk for Diabetes  5.7% to 6.4%  Diabetes                     >= 6.5%  Diabetic Goal                < 7.0%        Encourage low carb diet with no more than 45 gm of cab per meal  Currently on Saxeda 3mg sq qd.  I recommend switching to Ozempic as the patient may nolonger be responding to liraglutide  Patient notes that she will contact her insurance company regarding cost  Will start metformin 500mg po 1 tab BID  Pt notes constipation but is not willing to stop glp1 agonist  Metformin may help control A!c and help with constipation      3. Generalized obesity  Assessment & Plan:  Weight       03/10/21  1457   Weight: 106 kg (234 lb 9.6 oz)      BMI Body mass index is 37.87 kg/m².   Goal weight loss >/= 5% of TBW q 3 months  If patient does not achieve body weight goals at 3 month  sarina, recommend changing medical It has been more than 3 mpnths and the patient seems to have plateaued  Would  consider starting the patient on Ozempic an stopping saxenda  Patient is a candidate for bariatric surgery        4. Multiple thyroid nodules  Assessment & Plan:  Will repeat thyroid ultrasound    Orders:  -     US Thyroid; Future    Other orders  -     metFORMIN (Glucophage) 500 MG tablet; Take 1 tab in the morning x 1 week, then take 1 tab in the morning and 1 tab at night and stay there.  Dispense: 60 tablet; Refill: 11  -     ergocalciferol (Drisdol) 1.25 MG (83877 UT) capsule; Take 1 po twice a day week  Dispense: 26 capsule; Refill: 3    I spent 60 minutes caring for Lani on this date of service. This time includes time spent by me in the following activities:preparing for the visit, reviewing tests, obtaining and/or reviewing a separately obtained history, performing a medically appropriate examination and/or evaluation , counseling and educating the patient/family/caregiver, ordering medications, tests, or procedures and documenting information in the medical record  Follow Up   Return in about 3 months (around 6/10/2021).  Patient was given instructions and counseling regarding her condition or for health maintenance advice. Please see specific information pulled into the AVS if appropriate.

## 2021-03-11 NOTE — ASSESSMENT & PLAN NOTE
Weight       03/10/21  1457   Weight: 106 kg (234 lb 9.6 oz)      BMI Body mass index is 37.87 kg/m².   Goal weight loss >/= 5% of TBW q 3 months  If patient does not achieve body weight goals at 3 month  sarina, recommend changing medical It has been more than 3 mpnths and the patient seems to have plateaued  Would consider starting the patient on Ozempic an stopping saxenda  Patient is a candidate for bariatric surgery

## 2021-03-11 NOTE — ASSESSMENT & PLAN NOTE
Lipid Panel    Lipid Panel 7/21/20 3/1/21   Total Cholesterol 138 173   Triglycerides 105 153 (A)   HDL Cholesterol  46   VLDL Cholesterol  27   LDL Cholesterol   100   (A) Abnormal value       Comments are available for some flowsheets but are not being displayed.         Continue Crestor 10 mg po qd

## 2021-03-11 NOTE — ASSESSMENT & PLAN NOTE
Goal Hgb A1c <7  Goal fasting BG <130 mg/dl  Goal BG prior to meal <180  Most recent hgb A1c was   Hemoglobin A1C   Date Value Ref Range Status   03/01/2021 5.70 (H) 4.80 - 5.60 % Final     Comment:     Hemoglobin A1C Ranges:  Increased Risk for Diabetes  5.7% to 6.4%  Diabetes                     >= 6.5%  Diabetic Goal                < 7.0%        Encourage low carb diet with no more than 45 gm of cab per meal  Currently on Saxeda 3mg sq qd.  I recommend switching to Ozempic as the patient may nolonger be responding to liraglutide  Patient notes that she will contact her insurance company regarding cost  Will start metformin 500mg po 1 tab BID  Pt notes constipation but is not willing to stop glp1 agonist  Metformin may help control A!c and help with constipation

## 2021-10-19 DIAGNOSIS — E78.5 DYSLIPIDEMIA: ICD-10-CM

## 2021-10-19 RX ORDER — MONTELUKAST SODIUM 10 MG/1
10 TABLET ORAL NIGHTLY
Qty: 30 TABLET | Refills: 0 | Status: CANCELLED | OUTPATIENT
Start: 2021-10-19

## 2021-10-19 RX ORDER — SERTRALINE HYDROCHLORIDE 25 MG/1
25 TABLET, FILM COATED ORAL DAILY
Qty: 90 TABLET | Refills: 3 | Status: CANCELLED | OUTPATIENT
Start: 2021-10-19

## 2021-10-19 RX ORDER — ROSUVASTATIN CALCIUM 10 MG/1
10 TABLET, COATED ORAL
Qty: 90 TABLET | Refills: 3 | Status: CANCELLED | OUTPATIENT
Start: 2021-10-19

## 2021-10-19 NOTE — TELEPHONE ENCOUNTER
Sent to pcp to see if they will refill medication since both Dr are no longer here in the office

## 2021-10-19 NOTE — TELEPHONE ENCOUNTER
Patient called    She needs her sertraline, allopurinol, crestor and singular refilled. They are .    Kunal

## 2021-10-22 ENCOUNTER — PATIENT MESSAGE (OUTPATIENT)
Dept: ENDOCRINOLOGY | Age: 61
End: 2021-10-22

## 2021-10-22 ENCOUNTER — TELEPHONE (OUTPATIENT)
Dept: ENDOCRINOLOGY | Age: 61
End: 2021-10-22

## 2021-10-22 DIAGNOSIS — Z78.0 MENOPAUSE PRESENT: ICD-10-CM

## 2021-10-22 DIAGNOSIS — E78.5 DYSLIPIDEMIA: ICD-10-CM

## 2021-10-22 DIAGNOSIS — R73.03 PREDIABETES: Primary | ICD-10-CM

## 2021-10-22 RX ORDER — ROSUVASTATIN CALCIUM 10 MG/1
10 TABLET, COATED ORAL
Qty: 30 TABLET | Refills: 0 | Status: SHIPPED | OUTPATIENT
Start: 2021-10-22 | End: 2021-11-15 | Stop reason: SDUPTHER

## 2021-10-22 RX ORDER — SERTRALINE HYDROCHLORIDE 25 MG/1
25 TABLET, FILM COATED ORAL DAILY
Qty: 30 TABLET | Refills: 0 | Status: SHIPPED | OUTPATIENT
Start: 2021-10-22 | End: 2021-11-15 | Stop reason: SDUPTHER

## 2021-10-22 NOTE — TELEPHONE ENCOUNTER
----- Message from Sharlene Munoz MA sent at 10/22/2021 12:31 PM EDT -----  Regarding: FW: Need prescriptions ASAP    ----- Message -----  From: Lani Craft  Sent: 10/22/2021  11:46 AM EDT  To: Vishal Johnson [facility]  Subject: Need prescriptions ASAP                          On Tuesday 10/19, a lady called to reschedule my Dr appt to 11/15 as she said the Dr had been overbooked.  I told her I had 4 prescriptions that had  & I need them ASAP.  She said she would send them to be reviewed.  The 4 that have  & I am in need of new prescriptions are:    Rosuvastatin   Allopurinol - only have 6 left  Sertraline - zero left  Montelukast - zero left  Please let me know if there is a problem.  Thank you

## 2021-10-22 NOTE — TELEPHONE ENCOUNTER
I will renew the rosuvastatin,metformin, sertraline  until next visit early November. Please see PCP about singulair, montelukast.

## 2021-10-22 NOTE — TELEPHONE ENCOUNTER
From: Lani Craft  To: Tanna Michaels MD  Sent: 10/22/2021 11:46 AM EDT  Subject: Need prescriptions ASAP    On Tuesday 10/19, a lady called to reschedule my Dr appt to 11/15 as she said the Dr had been overbooked. I told her I had 4 prescriptions that had  & I need them ASAP. She said she would send them to be reviewed.  The 4 that have  & I am in need of new prescriptions are:   Rosuvastatin   Allopurinol - only have 6 left  Sertraline - zero left  Montelukast - zero left  Please let me know if there is a problem.  Thank you

## 2021-10-29 DIAGNOSIS — R73.03 PREDIABETES: Primary | ICD-10-CM

## 2021-11-15 ENCOUNTER — OFFICE VISIT (OUTPATIENT)
Dept: ENDOCRINOLOGY | Age: 61
End: 2021-11-15

## 2021-11-15 VITALS
HEART RATE: 116 BPM | OXYGEN SATURATION: 93 % | BODY MASS INDEX: 40.95 KG/M2 | HEIGHT: 66 IN | DIASTOLIC BLOOD PRESSURE: 64 MMHG | SYSTOLIC BLOOD PRESSURE: 132 MMHG | WEIGHT: 254.8 LBS

## 2021-11-15 DIAGNOSIS — R60.0 LOCALIZED EDEMA: ICD-10-CM

## 2021-11-15 DIAGNOSIS — E78.5 DYSLIPIDEMIA: ICD-10-CM

## 2021-11-15 DIAGNOSIS — E79.0 HYPERURICEMIA: ICD-10-CM

## 2021-11-15 DIAGNOSIS — R73.03 PREDIABETES: Primary | ICD-10-CM

## 2021-11-15 DIAGNOSIS — Z78.0 MENOPAUSE PRESENT: ICD-10-CM

## 2021-11-15 DIAGNOSIS — E66.9 GENERALIZED OBESITY: ICD-10-CM

## 2021-11-15 DIAGNOSIS — E55.9 VITAMIN D DEFICIENCY DISEASE: ICD-10-CM

## 2021-11-15 DIAGNOSIS — N95.1 HOT FLASHES DUE TO MENOPAUSE: ICD-10-CM

## 2021-11-15 PROCEDURE — 99214 OFFICE O/P EST MOD 30 MIN: CPT | Performed by: INTERNAL MEDICINE

## 2021-11-15 RX ORDER — ROSUVASTATIN CALCIUM 10 MG/1
10 TABLET, COATED ORAL
Qty: 90 TABLET | Refills: 1 | Status: SHIPPED | OUTPATIENT
Start: 2021-11-15 | End: 2022-05-18 | Stop reason: SDUPTHER

## 2021-11-15 RX ORDER — ERGOCALCIFEROL 1.25 MG/1
50000 CAPSULE ORAL WEEKLY
Qty: 13 CAPSULE | Refills: 3 | Status: SHIPPED | OUTPATIENT
Start: 2021-11-15 | End: 2022-02-25 | Stop reason: SDUPTHER

## 2021-11-15 RX ORDER — ALLOPURINOL 100 MG/1
100 TABLET ORAL DAILY
Qty: 90 TABLET | Refills: 1 | Status: SHIPPED | OUTPATIENT
Start: 2021-11-15 | End: 2022-11-17 | Stop reason: SDUPTHER

## 2021-11-15 RX ORDER — LIRAGLUTIDE 6 MG/ML
3 INJECTION, SOLUTION SUBCUTANEOUS DAILY
Qty: 15 PEN | Refills: 3 | Status: SHIPPED | OUTPATIENT
Start: 2021-11-15 | End: 2022-03-17 | Stop reason: SDUPTHER

## 2021-11-15 NOTE — PROGRESS NOTES
"Chief Complaint  Prediabetes    Subjective          Lani Craft presents to [facility] ENDOCRINOLOG[facility]  History of Present Illness     61-year-old female BMI 41 presenting with metabolic syndrome with concern for weight management. Past medical history includes prediabetes, symptomatic menopause, BMI 41, hyperuricemia, hyperlipidemia., Hyperuricemia    # prediabetes without parasthesias in a patient with metabolic syndrome. She is not on a carbohydrate counting system of meal planning. GLP-1 agent also being used for weight management was discontinued due to lifestyle changes. She has not been meal planning. She is currently taking Metformin 500 mg daily    # menopause symptomatic- menses ended 5-6 years ago. Hot flashes occurred with menses and without. Zoloft 25 mg daily helped . The hot flashes are less frequent . Skin upper trunk and face hot flushing. In past was the entire body.    # weight management. She has been off the GLP-1 and has gained weight. She is now working from home so her schedule has changed. She would like to restart meal planning and GLP-1 agent.  #Hyperuricemia: History of gout great toe. On allopurinol no recent acute episodes.    #Hyperlipidemia. Improving gradually. Taking rosuvastatin 10 mg daily.  No cad, no cva.     #Vitamin D deficiency on supplement. No kidney stones. No hypercalcemia.    Objective   Vital Signs:   /64   Pulse 116   Ht 167.6 cm (66\")   Wt 116 kg (254 lb 12.8 oz)   SpO2 93%   BMI 41.13 kg/m²     Physical Exam  Vitals and nursing note reviewed.   HENT:      Head: Normocephalic.      Mouth/Throat:      Mouth: Mucous membranes are moist.   Cardiovascular:      Rate and Rhythm: Normal rate.      Pulses: Normal pulses.      Heart sounds: Normal heart sounds.   Pulmonary:      Effort: Pulmonary effort is normal.      Breath sounds: Normal breath sounds. No wheezing or rhonchi.   Abdominal:      General: Bowel sounds are normal.      Palpations: " Abdomen is soft.   Musculoskeletal:         General: No swelling. Normal range of motion.      Cervical back: Normal range of motion and neck supple.      Right lower leg: Edema present.      Left lower leg: Edema present.   Skin:     General: Skin is warm and dry.   Neurological:      Mental Status: She is alert and oriented to person, place, and time. Mental status is at baseline.   Psychiatric:         Mood and Affect: Mood normal.         Behavior: Behavior normal.         Judgment: Judgment normal.          Result Review :  By Tanna Micheals MD    Most recent lab work 8 months ago.  Metabolic syndrome includes prediabetes with persistent mild hemoglobin A1c elevation as noted below.      Component   Ref Range & Units 8 mo ago   (3/1/21) 1 yr ago   (7/21/20) 2 yr ago   (11/15/19) 2 yr ago   (3/27/19) 3 yr ago   (8/16/18) 3 yr ago   (2/1/18) 4 yr ago   (8/15/17)   Hemoglobin A1C   4.80 - 5.60 % 5.70 High   5.70 High  CM  5.80 High  CM  5.40 CM  5.60 CM  5.30 CM  5.70 High  CM      Component   Ref Range & Units 8 mo ago   (3/1/21) 1 yr ago   (7/21/20) 2 yr ago   (11/15/19) 2 yr ago   (3/27/19) 3 yr ago   (8/16/18) 3 yr ago   (2/1/18) 4 yr ago   (8/15/17)   C-Peptide   1.1 - 4.4 ng/mL 3.2  4.0 CM  3.1 CM  2.6 CM  1.9 CM  3.8 CM  2.8 CM        Lipid panel consistent with metabolic syndrome with elevated triglyceride currently mildly elevated.  Component      Latest Ref Rng & Units 3/1/2021   Total Cholesterol      0 - 200 mg/dL 173   Triglycerides      0 - 150 mg/dL 153 (H)   HDL Cholesterol      40 - 60 mg/dL 46   VLDL Cholesterol Rick      5 - 40 mg/dL 27   LDL Cholesterol       0 - 100 mg/dL 100        Thyroid hormone evaluation 8 months ago was unremarkable    Component      Latest Ref Rng & Units 3/1/2021   TSH Baseline      0.450 - 4.500 uIU/mL 1.390   T4, Total      4.5 - 12.0 ug/dL 6.0   T3 Uptake      24 - 39 % 23 (L)   Free Thyroxine Index      1.2 - 4.9 1.4   Thyroid Peroxidase Antibody      0 -  34 IU/mL <9   Thyroglobulin Ab      0.0 - 0.9 IU/mL <1.0   T3, Free      2.0 - 4.4 pg/mL 2.9   Free T4      0.93 - 1.70 ng/dL 1.01     Vitamin D deficiency  Vitamin D 25 Hydroxy stable on current supplement.  Order: [id number]   Status: Final result     Visible to patient: Yes (not seen)     Next appt: 05/09/2022 at 08:20 AM in Endocrinology ([facility])     Dx: Vitamin D deficiency disease; Dyslipi...    Specimen Information: Blood         0 Result Notes    Component   Ref Range & Units 8 mo ago   (3/1/21) 1 yr ago   (7/21/20) 2 yr ago   (11/15/19) 2 yr ago   (3/27/19) 3 yr ago   (8/16/18) 3 yr ago   (2/1/18) 4 yr ago   (8/15/17)   25 Hydroxy, Vitamin D   30.0 - 100.0 ng/ml 50.6  71.3 CM  64.0 R, CM  31.3 CM  45.5 CM  59.1 R, CM  36.1 R, CM           Assessment and Plan      61-year-old female BMI 41 presenting with metabolic syndrome with concern for weight management. Past medical history includes prediabetes, symptomatic menopause, BMI 41, hyperuricemia, hyperlipidemia. Patient has had multiple lifestyle modifications which is interfered with self-care. Her schedule is changed. She is now working from home. She is working on changing her routines and restarting her medications.    Diagnoses and all orders for this visit:    1. Prediabetes (Primary)  Assessment & Plan:    Lifestyle modifications: to be gentle to the carbohydrate management sytem.  Meal planning    60 g plan  Meals 3 meals and 3 snacks  Please limit meals to 60 g (5 servings)  Please limit snacks to 15-20 g (1 serving)    Exercise work up to 30 min daily walking, movement    Orders:  -     Hemoglobin A1c  -     metFORMIN (Glucophage) 500 MG tablet; Take 1 tab in the morning x 1 week, then take 1 tab in the morning and 1 tab at night and stay there.  Dispense: 180 tablet; Refill: 1    2. Menopause present  -     sertraline (ZOLOFT) 50 MG tablet; Take 1 tablet by mouth Daily.  Dispense: 90 tablet; Refill: 1    3. Generalized obesity  -      Saxenda 18 MG/3ML injection pen; Inject 3 mg under the skin into the appropriate area as directed Daily.  Dispense: 15 pen; Refill: 3    4. Hyperuricemia  -     allopurinol (Zyloprim) 100 MG tablet; Take 1 tablet by mouth Daily.  Dispense: 90 tablet; Refill: 1  -     Uric Acid; Future    5. Dyslipidemia  -     rosuvastatin (CRESTOR) 10 MG tablet; Take 1 tablet by mouth every night at bedtime.  Dispense: 90 tablet; Refill: 1    6. Vitamin D deficiency disease  -     ergocalciferol (Drisdol) 1.25 MG (03843 UT) capsule; Take 1 capsule by mouth 1 (One) Time Per Week. Take 1 po twice a day week  Dispense: 13 capsule; Refill: 3  -     Lipid Panel  -     Vitamin D 25 Hydroxy    7. Localized edema  -     TSH  -     T4, Free  #Counseling:  I spent 30 minutes caring for Lani on this date of service. This time includes time spent by me in the following activities: performing a medically appropriate examination and/or evaluation, counseling and educating the patient/family/caregiver and care coordination     Follow Up   Return in about 6 months (around 5/15/2022).  Patient was given instructions and counseling regarding her condition or for health maintenance advice. Please see specific information pulled into the AVS if appropriate.

## 2021-11-15 NOTE — PATIENT INSTRUCTIONS
Home instructions      -------------------------------------------------------------------------------------------------------------------------    Using cell phone [employer]      Meal planning    60 g plan  Meals 3 meals and 3 snacks  Please limit meals to 60 g (5 servings)  Please limit snacks to 15-20 g (1 serving)    Counting carbohydrates use phone eli calorie [email or web address]  1 serving equal 15 g (about half a cup)  2 servings equal 30 g (about 1 cup)  3 servings equals 45 g  4 servings equal 60 g  5 servings ago 75 g  6 servings equals 90 g    Breakfast example (5 servings of carbohydrates)    A.  Half a cup cereal (2 servings) and 1 cup milk (1 serving) and 1 fruit/half a cup fruit juice (1 serving), 1 slice of bread with peanut butter (1 serving) = 5 servings carbohydrate    B.  Eggs plus petty plus sausage plus cheese with 2 slices of bread (2 servings) and one yogurt (1 serving )and one fruit (1 serving) and half a cup hash brown potatoes (1 serving) =5 servings carbohydrate    Lunch or dinner example  A.  Perkins equals 2 slices of bread (2 servings) and 1 fruit (1 serving), half a cup of potato salad (1 serving) and half a cup of ice cream (1 serving ) = 5 servings carbohydrates    B.  Any meat/chicken/fish and 2 cups Pasta (4 servings) and 1 slice of bread (1 serving)  = 5 servings carbohydrate    C.  Any meat chicken fish 1 cup of rice (3 servings), 1 fruit (1 serving) = 4 servings of carbohydrate    May add vegetables/salads to any meal for free    Except the following are 1 serving of carbohydrates    A.  Lima beans half a cup (1 serving)  B.  1 cup kidney/knutson beans (1 serving 30 g -14 g fiber equals 16 g)  C.  Half a cup of corn (1 serving)  D.  Half cup of peas (1 serving)  E.  Half a cup of potato, cubes (medium potato) (1 serving)    F  Sweets: Prep packaged 3 ounce ice cream cup, Fair brand/Nestlé 1 serving (15 g carbohydrates)    Notes: Fruits: 1 fruit about the size for lady's visit is 1  serving of carbohydrate  Half a cup fruit juice 1 serving    Snack examples (1 serving carbohydrate/15 g]    1.  No carbohydrates (0 servings):  Raw celery, rashes, bell pepper, banana peppers, corn, carrots, cucumbers dipped in salad dressings, guacamole, obese seasoning, peanut butter, almond butter, salsa    2.  15 g (1 serving): Half a sandwich: Turkey and cheese, smoked salmon and cream cheese, peanut butter and banana slices, cheese and crackers, peanut butter and crackers, half a bagel or half an English muffin or 1 cup of fruit plus cottage cheese or 1 rice cake with sugar-free fruit spreading    3.  15 g or less (1 serving): Protein shakes: Ensure DM, Glucerna, boost protein: Wings and salad dressing, 1 taco    4.  Fruit, 1 servin apple/orange/8 large grapes/peach/a protein/fat (avocado/nuts/meat/ cottage cheese)

## 2021-11-16 LAB
25(OH)D3+25(OH)D2 SERPL-MCNC: 47.9 NG/ML (ref 30–100)
CHOLEST SERPL-MCNC: 156 MG/DL (ref 100–199)
HBA1C MFR BLD: 6.1 % (ref 4.8–5.6)
HDLC SERPL-MCNC: 55 MG/DL
IMP & REVIEW OF LAB RESULTS: NORMAL
LDLC SERPL CALC-MCNC: 77 MG/DL (ref 0–99)
T4 FREE SERPL-MCNC: 1.06 NG/DL (ref 0.82–1.77)
TRIGL SERPL-MCNC: 139 MG/DL (ref 0–149)
TSH SERPL DL<=0.005 MIU/L-ACNC: 0.75 UIU/ML (ref 0.45–4.5)
URATE SERPL-MCNC: 4.5 MG/DL (ref 3–7.2)
VLDLC SERPL CALC-MCNC: 24 MG/DL (ref 5–40)

## 2021-11-16 NOTE — ASSESSMENT & PLAN NOTE
Lifestyle modifications: to be gentle to the carbohydrate management sytem.  Meal planning    60 g plan  Meals 3 meals and 3 snacks  Please limit meals to 60 g (5 servings)  Please limit snacks to 15-20 g (1 serving)    Exercise work up to 30 min daily walking, movement

## 2022-02-25 ENCOUNTER — PATIENT MESSAGE (OUTPATIENT)
Dept: ENDOCRINOLOGY | Age: 62
End: 2022-02-25

## 2022-02-25 DIAGNOSIS — E55.9 VITAMIN D DEFICIENCY DISEASE: ICD-10-CM

## 2022-02-25 DIAGNOSIS — E66.9 GENERALIZED OBESITY: ICD-10-CM

## 2022-02-25 RX ORDER — ERGOCALCIFEROL 1.25 MG/1
50000 CAPSULE ORAL WEEKLY
Qty: 13 CAPSULE | Refills: 3 | Status: SHIPPED | OUTPATIENT
Start: 2022-02-25 | End: 2022-03-09 | Stop reason: SDUPTHER

## 2022-02-25 NOTE — TELEPHONE ENCOUNTER
I spoke with Lani Craft this evening.   Reviewed chart and discussed vitamin D levels with patient. I do not disagree that I told her twice per week dosing. However on review I believe once per week vitamin D-3 50,000 units will be sufficient.     Action: prescription re-written.     I appreciate the opportunity and thank Lani for calling.   Tanna Michaels MD

## 2022-02-25 NOTE — TELEPHONE ENCOUNTER
From: Lani Craft  To: Tanna Michaels MD  Sent: 2/25/2022 2:23 PM EST  Subject: Vitamin D prescription     I am certain Dr Michaels told me to increase my dose from 1 to 2 pills per week. However when I contacted [facility] today for a refill, the system said it is not yet time for a refill. Last one was 1/5/22 and bottle still says one per day. Therefore it will be over 1 month before I can get another refill.  Please assist.

## 2022-03-09 DIAGNOSIS — E55.9 VITAMIN D DEFICIENCY DISEASE: ICD-10-CM

## 2022-03-09 RX ORDER — ERGOCALCIFEROL 1.25 MG/1
50000 CAPSULE ORAL WEEKLY
Qty: 13 CAPSULE | Refills: 3 | Status: SHIPPED | OUTPATIENT
Start: 2022-03-09 | End: 2022-03-17 | Stop reason: SDUPTHER

## 2022-03-17 ENCOUNTER — TELEPHONE (OUTPATIENT)
Dept: ENDOCRINOLOGY | Age: 62
End: 2022-03-17

## 2022-03-17 DIAGNOSIS — E55.9 VITAMIN D DEFICIENCY DISEASE: ICD-10-CM

## 2022-03-17 DIAGNOSIS — E66.9 GENERALIZED OBESITY: ICD-10-CM

## 2022-03-17 RX ORDER — ERGOCALCIFEROL 1.25 MG/1
50000 CAPSULE ORAL 2 TIMES WEEKLY
Qty: 24 CAPSULE | Refills: 3 | Status: SHIPPED | OUTPATIENT
Start: 2022-03-17

## 2022-03-17 RX ORDER — LIRAGLUTIDE 6 MG/ML
3 INJECTION, SOLUTION SUBCUTANEOUS DAILY
Qty: 15 PEN | Refills: 3 | Status: SHIPPED | OUTPATIENT
Start: 2022-03-17 | End: 2022-11-17 | Stop reason: SDUPTHER

## 2022-03-17 RX ORDER — LIRAGLUTIDE 6 MG/ML
3 INJECTION, SOLUTION SUBCUTANEOUS DAILY
Qty: 15 PEN | Refills: 3 | Status: SHIPPED | OUTPATIENT
Start: 2022-03-17 | End: 2022-03-17 | Stop reason: SDUPTHER

## 2022-04-12 ENCOUNTER — TELEPHONE (OUTPATIENT)
Dept: ENDOCRINOLOGY | Age: 62
End: 2022-04-12

## 2022-04-15 ENCOUNTER — TELEPHONE (OUTPATIENT)
Dept: ENDOCRINOLOGY | Age: 62
End: 2022-04-15

## 2022-04-15 NOTE — TELEPHONE ENCOUNTER
covermymeds requires more information to complete PA on trulicity please reference fax in media tab

## 2022-05-09 DIAGNOSIS — E55.9 VITAMIN D DEFICIENCY DISEASE: Primary | ICD-10-CM

## 2022-05-09 DIAGNOSIS — E79.0 HYPERURICEMIA: ICD-10-CM

## 2022-05-09 DIAGNOSIS — E04.2 MULTIPLE THYROID NODULES: ICD-10-CM

## 2022-05-09 DIAGNOSIS — E78.5 DYSLIPIDEMIA: ICD-10-CM

## 2022-05-09 DIAGNOSIS — R73.03 PREDIABETES: ICD-10-CM

## 2022-05-10 LAB
25(OH)D3+25(OH)D2 SERPL-MCNC: 63.7 NG/ML (ref 30–100)
ALBUMIN SERPL-MCNC: 4.6 G/DL (ref 3.8–4.9)
ALBUMIN/GLOB SERPL: 1.6 {RATIO} (ref 1.2–2.2)
ALP SERPL-CCNC: 78 IU/L (ref 44–121)
ALT SERPL-CCNC: 15 IU/L (ref 0–32)
AST SERPL-CCNC: 21 IU/L (ref 0–40)
BILIRUB SERPL-MCNC: <0.2 MG/DL (ref 0–1.2)
BUN SERPL-MCNC: 12 MG/DL (ref 8–27)
BUN/CREAT SERPL: 12 (ref 12–28)
CALCIUM SERPL-MCNC: 9.7 MG/DL (ref 8.7–10.3)
CHLORIDE SERPL-SCNC: 104 MMOL/L (ref 96–106)
CHOLEST SERPL-MCNC: 179 MG/DL (ref 100–199)
CO2 SERPL-SCNC: 19 MMOL/L (ref 20–29)
CREAT SERPL-MCNC: 1 MG/DL (ref 0.57–1)
EGFRCR SERPLBLD CKD-EPI 2021: 64 ML/MIN/1.73
GLOBULIN SER CALC-MCNC: 2.8 G/DL (ref 1.5–4.5)
GLUCOSE SERPL-MCNC: 94 MG/DL (ref 65–99)
HBA1C MFR BLD: 5.9 % (ref 4.8–5.6)
HDLC SERPL-MCNC: 49 MG/DL
IMP & REVIEW OF LAB RESULTS: NORMAL
LDLC SERPL CALC-MCNC: 109 MG/DL (ref 0–99)
POTASSIUM SERPL-SCNC: 4.3 MMOL/L (ref 3.5–5.2)
PROT SERPL-MCNC: 7.4 G/DL (ref 6–8.5)
SODIUM SERPL-SCNC: 139 MMOL/L (ref 134–144)
T4 FREE SERPL-MCNC: 1.17 NG/DL (ref 0.82–1.77)
TRIGL SERPL-MCNC: 118 MG/DL (ref 0–149)
TSH SERPL DL<=0.005 MIU/L-ACNC: 0.78 UIU/ML (ref 0.45–4.5)
URATE SERPL-MCNC: 4.7 MG/DL (ref 3–7.2)
VLDLC SERPL CALC-MCNC: 21 MG/DL (ref 5–40)

## 2022-05-18 ENCOUNTER — OFFICE VISIT (OUTPATIENT)
Dept: ENDOCRINOLOGY | Age: 62
End: 2022-05-18

## 2022-05-18 VITALS
SYSTOLIC BLOOD PRESSURE: 134 MMHG | WEIGHT: 240 LBS | HEART RATE: 80 BPM | DIASTOLIC BLOOD PRESSURE: 88 MMHG | BODY MASS INDEX: 38.57 KG/M2 | HEIGHT: 66 IN

## 2022-05-18 DIAGNOSIS — R73.03 PREDIABETES: Primary | ICD-10-CM

## 2022-05-18 DIAGNOSIS — E04.2 MULTIPLE THYROID NODULES: ICD-10-CM

## 2022-05-18 DIAGNOSIS — E55.9 VITAMIN D DEFICIENCY DISEASE: ICD-10-CM

## 2022-05-18 DIAGNOSIS — R60.0 LOCALIZED EDEMA: ICD-10-CM

## 2022-05-18 DIAGNOSIS — E78.5 DYSLIPIDEMIA: ICD-10-CM

## 2022-05-18 PROCEDURE — 99214 OFFICE O/P EST MOD 30 MIN: CPT | Performed by: NURSE PRACTITIONER

## 2022-05-18 RX ORDER — ROSUVASTATIN CALCIUM 10 MG/1
10 TABLET, COATED ORAL
Qty: 90 TABLET | Refills: 1 | Status: SHIPPED | OUTPATIENT
Start: 2022-05-18

## 2022-05-18 NOTE — PROGRESS NOTES
"Chief Complaint  No chief complaint on file.    Subjective          Lani Craft presents to [facility] ENDOCRINOLOGY  History of Present Illness   F/u from OV with Dr Ortiz 11/2021    On zoloft for hot flashes- tolerating well, dose was increased at your last visit      obestiy- saxenda 3mg   Down 5% of body weight     Prediabetes- 500mg BID   Plan last visit :  60 g plan  Meals 3 meals and 3 snacks  Please limit meals to 60 g (5 servings)  Please limit snacks to 15-20 g (1 serving)   Exercise work up to 30 min daily walking, movement   walking has been hard with her busy work schedule   She is doing her best to follow this above carb plan     Hyperlipidemia- crestor 10mg, tolerating well, unsure but doesn't think she is having myalgias     MNG- pcp following   Last u/s 2017, will let us know where she wants to have the f/u u/s done     Vitamin d   50,000u 2x/week     Objective   Vital Signs:  /88   Pulse 80   Ht 167.6 cm (66\")   Wt 109 kg (240 lb)   BMI 38.74 kg/m²   BMI has not been calculated during today's encounter.       Physical Exam  Vitals reviewed.   Constitutional:       General: She is not in acute distress.  HENT:      Head: Normocephalic and atraumatic.   Cardiovascular:      Rate and Rhythm: Normal rate and regular rhythm.   Pulmonary:      Effort: Pulmonary effort is normal. No respiratory distress.   Musculoskeletal:         General: No signs of injury. Normal range of motion.      Cervical back: Normal range of motion and neck supple.   Skin:     General: Skin is warm and dry.   Neurological:      Mental Status: She is alert and oriented to person, place, and time. Mental status is at baseline.   Psychiatric:         Mood and Affect: Mood normal.         Behavior: Behavior normal.         Thought Content: Thought content normal.         Judgment: Judgment normal.        Result Review :   The following data was reviewed by: GILBERTO Fountain on 05/18/2022:  Common " labs    Common Labsle 11/15/21 11/15/21 11/15/21 5/9/22 5/9/22 5/9/22 5/9/22    1601 1601 1601 0920 0920 0920 0920   Glucose    94      BUN    12      Creatinine    1.00      Sodium    139      Potassium    4.3      Chloride    104      Calcium    9.7      Total Protein    7.4      Albumin    4.6      Total Bilirubin    <0.2      Alkaline Phosphatase    78      AST (SGOT)    21      ALT (SGPT)    15      Total Cholesterol  156     179   Triglycerides  139     118   HDL Cholesterol  55     49   LDL Cholesterol   77     109 (A)   Hemoglobin A1C 6.1 (A)    5.9 (A)     Uric Acid   4.5   4.7    (A) Abnormal value       Comments are available for some flowsheets but are not being displayed.                     Assessment and Plan    Diagnoses and all orders for this visit:    1. Prediabetes (Primary)  -     metFORMIN (Glucophage) 500 MG tablet; Take 1 tab in the morning x 1 week, then take 1 tab in the morning and 1 tab at night and stay there.  Dispense: 180 tablet; Refill: 1    2. Dyslipidemia  -     rosuvastatin (CRESTOR) 10 MG tablet; Take 1 tablet by mouth every night at bedtime.  Dispense: 90 tablet; Refill: 1    3. Vitamin D deficiency disease    4. Multiple thyroid nodules    5. Localized edema             Follow Up   Return in about 6 months (around 11/18/2022).     Edema- consider BRENDA or cardiology eval. Wear katelyn hose and decrease salt intake. Elevate legs at night, written info provided.   Continue zoloft per Dr OROPEZA  Continue statin, LDL goal < 100  Continue saxenda and efforts in weight loss, consider wegoovy for once weekly dosing   Continue metformin and increase exercise along with healthy eating habits   Continue carb counting      Patient was given instructions and counseling regarding her condition or for health maintenance advice. Please see specific information pulled into the AVS if appropriate.     Malena Boles, APRN

## 2022-08-31 DIAGNOSIS — Z78.0 MENOPAUSE PRESENT: ICD-10-CM

## 2022-09-02 NOTE — TELEPHONE ENCOUNTER
Rx Refill Note  Requested Prescriptions     Pending Prescriptions Disp Refills    sertraline (ZOLOFT) 50 MG tablet [Pharmacy Med Name: SERTRALINE HCL 50 MG TABLET] 90 tablet 1     Sig: TAKE ONE TABLET BY MOUTH DAILY      Last office visit with prescribing clinician: 11/15/2021      Next office visit with prescribing clinician: Lisa Mann  09/02/22, 07:15 EDT

## 2022-11-09 ENCOUNTER — TELEPHONE (OUTPATIENT)
Dept: ENDOCRINOLOGY | Age: 62
End: 2022-11-09

## 2022-11-10 ENCOUNTER — DOCUMENTATION (OUTPATIENT)
Dept: ENDOCRINOLOGY | Age: 62
End: 2022-11-10

## 2022-11-10 NOTE — PROGRESS NOTES
Benefits Investigation Summary    Prescription: Renewal     Dispensing pharmacy: [facility]    Copay amount: saxenda 12/2    [facility]   BIN: [id number]  PCN: [id number]  RX GROUP: [id number]    Prior Auth and Med Assistance notes:

## 2022-11-17 ENCOUNTER — OFFICE VISIT (OUTPATIENT)
Dept: ENDOCRINOLOGY | Age: 62
End: 2022-11-17

## 2022-11-17 VITALS
TEMPERATURE: 97 F | HEART RATE: 109 BPM | BODY MASS INDEX: 36.9 KG/M2 | HEIGHT: 66 IN | OXYGEN SATURATION: 98 % | SYSTOLIC BLOOD PRESSURE: 130 MMHG | DIASTOLIC BLOOD PRESSURE: 70 MMHG | WEIGHT: 229.6 LBS

## 2022-11-17 DIAGNOSIS — E55.9 VITAMIN D DEFICIENCY DISEASE: ICD-10-CM

## 2022-11-17 DIAGNOSIS — E04.1 THYROID NODULE: ICD-10-CM

## 2022-11-17 DIAGNOSIS — E79.0 HYPERURICEMIA: ICD-10-CM

## 2022-11-17 DIAGNOSIS — E66.9 GENERALIZED OBESITY: ICD-10-CM

## 2022-11-17 DIAGNOSIS — R73.03 PREDIABETES: Primary | ICD-10-CM

## 2022-11-17 DIAGNOSIS — Z78.0 MENOPAUSE PRESENT: ICD-10-CM

## 2022-11-17 DIAGNOSIS — E78.5 DYSLIPIDEMIA: ICD-10-CM

## 2022-11-17 PROCEDURE — 99214 OFFICE O/P EST MOD 30 MIN: CPT | Performed by: NURSE PRACTITIONER

## 2022-11-17 RX ORDER — LIRAGLUTIDE 6 MG/ML
3 INJECTION, SOLUTION SUBCUTANEOUS DAILY
Qty: 15 ML | Refills: 5 | Status: SHIPPED | OUTPATIENT
Start: 2022-11-17

## 2022-11-17 RX ORDER — ALLOPURINOL 100 MG/1
100 TABLET ORAL DAILY
Qty: 90 TABLET | Refills: 0 | Status: SHIPPED | OUTPATIENT
Start: 2022-11-17

## 2022-11-17 NOTE — PROGRESS NOTES
"Chief Complaint  Prediabetes    Subjective        Lani Craft presents to [facility] ENDOCRINOLOGY  History of Present Illness     Requesting Iron to be drawn with labs today    Needing refills on allopurinol, Zoloft-these have been filled by Dr. Valenzuela  Using Zoloft for hot flashes, denies mental health illness    Currently on  Metformin 500mg 1-2x/day   Tolerating well  On statin    Obesity  Saxenda 3 mg daily  Down 11 pounds since May    MNG  Last ultrasound 2017    Objective   Vital Signs:  /70   Pulse 109   Temp 97 °F (36.1 °C) (Temporal)   Ht 167.6 cm (66\")   Wt 104 kg (229 lb 9.6 oz)   SpO2 98%   BMI 37.06 kg/m²   Estimated body mass index is 37.06 kg/m² as calculated from the following:    Height as of this encounter: 167.6 cm (66\").    Weight as of this encounter: 104 kg (229 lb 9.6 oz).          Physical Exam  Vitals reviewed.   Constitutional:       General: She is not in acute distress.  HENT:      Head: Normocephalic and atraumatic.   Cardiovascular:      Rate and Rhythm: Normal rate and regular rhythm.   Pulmonary:      Effort: Pulmonary effort is normal. No respiratory distress.   Musculoskeletal:         General: No signs of injury. Normal range of motion.      Cervical back: Normal range of motion and neck supple.   Skin:     General: Skin is warm and dry.   Neurological:      Mental Status: She is alert and oriented to person, place, and time. Mental status is at baseline.   Psychiatric:         Mood and Affect: Mood normal.         Behavior: Behavior normal.         Thought Content: Thought content normal.         Judgment: Judgment normal.        Result Review :  The following data was reviewed by: GILBERTO Fountain on 11/17/2022:  Common labs    Common Labs 5/9/22 5/9/22 5/9/22 5/9/22    0920 0920 0920 0920   Glucose 94      BUN 12      Creatinine 1.00      Sodium 139      Potassium 4.3      Chloride 104      Calcium 9.7      Total Protein 7.4      Albumin 4.6    "   Total Bilirubin <0.2      Alkaline Phosphatase 78      AST (SGOT) 21      ALT (SGPT) 15      Total Cholesterol    179   Triglycerides    118   HDL Cholesterol    49   LDL Cholesterol     109 (A)   Hemoglobin A1C  5.9 (A)     Uric Acid   4.7    (A) Abnormal value       Comments are available for some flowsheets but are not being displayed.                     Assessment and Plan   Diagnoses and all orders for this visit:    1. Prediabetes (Primary)  -     TSH  -     T4, Free  -     T3, Free  -     Iron Profile  -     Hemoglobin A1c  -     Comprehensive Metabolic Panel  -     Lipid Panel  -     Microalbumin / Creatinine Urine Ratio - Urine, Clean Catch  -     Vitamin D,25-Hydroxy    2. Thyroid nodule  -     US Thyroid; Future  -     TSH  -     T4, Free  -     T3, Free  -     Iron Profile  -     Hemoglobin A1c  -     Comprehensive Metabolic Panel  -     Lipid Panel  -     Microalbumin / Creatinine Urine Ratio - Urine, Clean Catch  -     Vitamin D,25-Hydroxy    3. Menopause present  -     TSH  -     T4, Free  -     T3, Free  -     Iron Profile  -     sertraline (ZOLOFT) 50 MG tablet; Take 1 tablet by mouth Daily.  Dispense: 90 tablet; Refill: 0  -     Hemoglobin A1c  -     Comprehensive Metabolic Panel  -     Lipid Panel  -     Microalbumin / Creatinine Urine Ratio - Urine, Clean Catch  -     Vitamin D,25-Hydroxy    4. Hyperuricemia  -     TSH  -     T4, Free  -     T3, Free  -     Iron Profile  -     allopurinol (Zyloprim) 100 MG tablet; Take 1 tablet by mouth Daily.  Dispense: 90 tablet; Refill: 0  -     Hemoglobin A1c  -     Comprehensive Metabolic Panel  -     Lipid Panel  -     Microalbumin / Creatinine Urine Ratio - Urine, Clean Catch  -     Vitamin D,25-Hydroxy    5. Generalized obesity  -     TSH  -     T4, Free  -     T3, Free  -     Iron Profile  -     Saxenda 18 MG/3ML injection pen; Inject 3 mg under the skin into the appropriate area as directed Daily.  Dispense: 15 mL; Refill: 5  -     Hemoglobin A1c  -      Comprehensive Metabolic Panel  -     Lipid Panel  -     Microalbumin / Creatinine Urine Ratio - Urine, Clean Catch  -     Vitamin D,25-Hydroxy    6. Dyslipidemia  -     TSH  -     T4, Free  -     T3, Free  -     Iron Profile  -     Hemoglobin A1c  -     Comprehensive Metabolic Panel  -     Lipid Panel  -     Microalbumin / Creatinine Urine Ratio - Urine, Clean Catch  -     Vitamin D,25-Hydroxy    7. Vitamin D deficiency disease  -     TSH  -     T4, Free  -     T3, Free  -     Iron Profile  -     Hemoglobin A1c  -     Comprehensive Metabolic Panel  -     Lipid Panel  -     Microalbumin / Creatinine Urine Ratio - Urine, Clean Catch  -     Vitamin D,25-Hydroxy             Follow Up   Return in about 6 months (around 5/17/2023).     Skin dryness- to see dermatology   Arrange thyroid u/s   Will give 90 day supply of zoloft and allopurinol but refills will need to come from pcp   Labs today  Continue metformin  Continue Saxenda, healthy eating habits and weight loss efforts  Continue statin  Ensure vitamin D levels favorable    Patient was given instructions and counseling regarding her condition or for health maintenance advice. Please see specific information pulled into the AVS if appropriate.     GILBERTO Fountain

## 2022-11-18 LAB
25(OH)D3+25(OH)D2 SERPL-MCNC: 64.2 NG/ML (ref 30–100)
ALBUMIN SERPL-MCNC: 4.6 G/DL (ref 3.5–5.2)
ALBUMIN/CREAT UR: 7 MG/G CREAT (ref 0–29)
ALBUMIN/GLOB SERPL: 1.8 G/DL
ALP SERPL-CCNC: 77 U/L (ref 39–117)
ALT SERPL-CCNC: 12 U/L (ref 1–33)
AST SERPL-CCNC: 22 U/L (ref 1–32)
BILIRUB SERPL-MCNC: <0.2 MG/DL (ref 0–1.2)
BUN SERPL-MCNC: 16 MG/DL (ref 8–23)
BUN/CREAT SERPL: 15.4 (ref 7–25)
CALCIUM SERPL-MCNC: 10 MG/DL (ref 8.6–10.5)
CHLORIDE SERPL-SCNC: 104 MMOL/L (ref 98–107)
CHOLEST SERPL-MCNC: 213 MG/DL (ref 0–200)
CO2 SERPL-SCNC: 24 MMOL/L (ref 22–29)
CREAT SERPL-MCNC: 1.04 MG/DL (ref 0.57–1)
CREAT UR-MCNC: 227.1 MG/DL
EGFRCR SERPLBLD CKD-EPI 2021: 61.7 ML/MIN/1.73
GLOBULIN SER CALC-MCNC: 2.5 GM/DL
GLUCOSE SERPL-MCNC: 86 MG/DL (ref 65–99)
HBA1C MFR BLD: 5.7 % (ref 4.8–5.6)
HDLC SERPL-MCNC: 57 MG/DL (ref 40–60)
IMP & REVIEW OF LAB RESULTS: NORMAL
IRON SATN MFR SERPL: 15 % (ref 20–50)
IRON SERPL-MCNC: 72 MCG/DL (ref 37–145)
LDLC SERPL CALC-MCNC: 133 MG/DL (ref 0–100)
MICROALBUMIN UR-MCNC: 15.3 UG/ML
POTASSIUM SERPL-SCNC: 4.6 MMOL/L (ref 3.5–5.2)
PROT SERPL-MCNC: 7.1 G/DL (ref 6–8.5)
SODIUM SERPL-SCNC: 140 MMOL/L (ref 136–145)
T3FREE SERPL-MCNC: 2.9 PG/ML (ref 2–4.4)
T4 FREE SERPL-MCNC: 1.09 NG/DL (ref 0.93–1.7)
TIBC SERPL-MCNC: 493 MCG/DL
TRIGL SERPL-MCNC: 131 MG/DL (ref 0–150)
TSH SERPL DL<=0.005 MIU/L-ACNC: 1.21 UIU/ML (ref 0.27–4.2)
UIBC SERPL-MCNC: 421 MCG/DL (ref 112–346)
VLDLC SERPL CALC-MCNC: 23 MG/DL (ref 5–40)

## 2023-09-22 DIAGNOSIS — E66.9 GENERALIZED OBESITY: ICD-10-CM

## 2023-09-25 RX ORDER — LIRAGLUTIDE 6 MG/ML
3 INJECTION, SOLUTION SUBCUTANEOUS DAILY
Qty: 15 ML | Refills: 5 | OUTPATIENT
Start: 2023-09-25

## 2023-10-18 ENCOUNTER — OFFICE VISIT (OUTPATIENT)
Dept: ENDOCRINOLOGY | Age: 63
End: 2023-10-18
Payer: COMMERCIAL

## 2023-10-18 VITALS
OXYGEN SATURATION: 99 % | BODY MASS INDEX: 36.03 KG/M2 | HEIGHT: 66 IN | HEART RATE: 97 BPM | SYSTOLIC BLOOD PRESSURE: 130 MMHG | WEIGHT: 224.2 LBS | DIASTOLIC BLOOD PRESSURE: 78 MMHG

## 2023-10-18 DIAGNOSIS — E78.5 DYSLIPIDEMIA: ICD-10-CM

## 2023-10-18 DIAGNOSIS — R73.03 PREDIABETES: Primary | ICD-10-CM

## 2023-10-18 DIAGNOSIS — E04.2 MULTIPLE THYROID NODULES: ICD-10-CM

## 2023-10-18 DIAGNOSIS — E55.9 VITAMIN D DEFICIENCY DISEASE: ICD-10-CM

## 2023-10-18 DIAGNOSIS — E66.9 GENERALIZED OBESITY: ICD-10-CM

## 2023-10-18 PROCEDURE — 99214 OFFICE O/P EST MOD 30 MIN: CPT | Performed by: NURSE PRACTITIONER

## 2023-10-18 NOTE — PROGRESS NOTES
"Chief Complaint  Prediabetes    Subjective        Lani Craft presents to [facility] ENDOCRINOLOGY  History of Present Illness    Prediabetes  Activity levels and Diet come and go  Working a lot, increased stress    Never saw derm for skin dryness  Sporadic use of metformin and statin    MNG  Never \"found the time\" to arrange her thyroid u/s    Obesity  Saxenda 3 mg daily, doesn't forget dosing as often       Objective   Vital Signs:  /78   Pulse 97   Ht 167.6 cm (65.98\")   Wt 102 kg (224 lb 3.2 oz)   SpO2 99%   BMI 36.20 kg/m²   Estimated body mass index is 36.2 kg/m² as calculated from the following:    Height as of this encounter: 167.6 cm (65.98\").    Weight as of this encounter: 102 kg (224 lb 3.2 oz).          Physical Exam  Vitals reviewed.   Constitutional:       General: She is not in acute distress.  HENT:      Head: Normocephalic and atraumatic.   Cardiovascular:      Rate and Rhythm: Normal rate.   Pulmonary:      Effort: Pulmonary effort is normal. No respiratory distress.   Musculoskeletal:         General: No signs of injury. Normal range of motion.      Cervical back: Normal range of motion and neck supple.   Skin:     General: Skin is warm and dry.   Neurological:      Mental Status: She is alert and oriented to person, place, and time. Mental status is at baseline.   Psychiatric:         Mood and Affect: Mood normal.         Behavior: Behavior normal.         Thought Content: Thought content normal.         Judgment: Judgment normal.          Result Review :  The following data was reviewed by: GILBERTO Fountain on 10/18/2023:  Common labs          11/17/2022    15:17   Common Labs   Glucose 86    BUN 16    Creatinine 1.04    Sodium 140    Potassium 4.6    Chloride 104    Calcium 10.0    Total Protein 7.1    Albumin 4.60    Total Bilirubin <0.2    Alkaline Phosphatase 77    AST (SGOT) 22    ALT (SGPT) 12    Total Cholesterol 213    Triglycerides 131    HDL " Cholesterol 57    LDL Cholesterol  133    Hemoglobin A1C 5.70    Microalbumin, Urine 15.3                   Assessment and Plan   Diagnoses and all orders for this visit:    1. Prediabetes (Primary)  -     Hemoglobin A1c  -     Comprehensive Metabolic Panel  -     Lipid Panel  -     TSH  -     T4, Free  -     Vitamin B12 & Folate  -     Vitamin D,25-Hydroxy    2. Multiple thyroid nodules  -     US Thyroid; Future  -     Hemoglobin A1c  -     Comprehensive Metabolic Panel  -     Lipid Panel  -     TSH  -     T4, Free  -     Vitamin B12 & Folate  -     Vitamin D,25-Hydroxy    3. Generalized obesity  -     Hemoglobin A1c  -     Comprehensive Metabolic Panel  -     Lipid Panel  -     TSH  -     T4, Free  -     Vitamin B12 & Folate  -     Vitamin D,25-Hydroxy    4. Dyslipidemia  -     Hemoglobin A1c  -     Comprehensive Metabolic Panel  -     Lipid Panel  -     TSH  -     T4, Free  -     Vitamin B12 & Folate  -     Vitamin D,25-Hydroxy    5. Vitamin D deficiency disease  -     Hemoglobin A1c  -     Comprehensive Metabolic Panel  -     Lipid Panel  -     TSH  -     T4, Free  -     Vitamin B12 & Folate  -     Vitamin D,25-Hydroxy             Follow Up   No follow-ups on file.    Labs today  Reordered thyroid u/s  Further recommendations based on results    Patient was given instructions and counseling regarding her condition or for health maintenance advice. Please see specific information pulled into the AVS if appropriate.       Malena Boles, APRN

## 2023-10-19 LAB
25(OH)D3+25(OH)D2 SERPL-MCNC: 49.2 NG/ML (ref 30–100)
ALBUMIN SERPL-MCNC: 4.5 G/DL (ref 3.5–5.2)
ALBUMIN/GLOB SERPL: 1.7 G/DL
ALP SERPL-CCNC: 83 U/L (ref 39–117)
ALT SERPL-CCNC: 31 U/L (ref 1–33)
AST SERPL-CCNC: 33 U/L (ref 1–32)
BILIRUB SERPL-MCNC: <0.2 MG/DL (ref 0–1.2)
BUN SERPL-MCNC: 14 MG/DL (ref 8–23)
BUN/CREAT SERPL: 18.4 (ref 7–25)
CALCIUM SERPL-MCNC: 10 MG/DL (ref 8.6–10.5)
CHLORIDE SERPL-SCNC: 103 MMOL/L (ref 98–107)
CHOLEST SERPL-MCNC: 190 MG/DL (ref 0–200)
CO2 SERPL-SCNC: 25.5 MMOL/L (ref 22–29)
CREAT SERPL-MCNC: 0.76 MG/DL (ref 0.57–1)
EGFRCR SERPLBLD CKD-EPI 2021: 89.3 ML/MIN/1.73
FOLATE SERPL-MCNC: 13.6 NG/ML (ref 4.78–24.2)
GLOBULIN SER CALC-MCNC: 2.6 GM/DL
GLUCOSE SERPL-MCNC: 97 MG/DL (ref 65–99)
HBA1C MFR BLD: 5.6 % (ref 4.8–5.6)
HDLC SERPL-MCNC: 51 MG/DL (ref 40–60)
IMP & REVIEW OF LAB RESULTS: NORMAL
LDLC SERPL CALC-MCNC: 104 MG/DL (ref 0–100)
POTASSIUM SERPL-SCNC: 4.3 MMOL/L (ref 3.5–5.2)
PROT SERPL-MCNC: 7.1 G/DL (ref 6–8.5)
SODIUM SERPL-SCNC: 138 MMOL/L (ref 136–145)
T4 FREE SERPL-MCNC: 0.99 NG/DL (ref 0.93–1.7)
TRIGL SERPL-MCNC: 205 MG/DL (ref 0–150)
TSH SERPL DL<=0.005 MIU/L-ACNC: 0.8 UIU/ML (ref 0.27–4.2)
VIT B12 SERPL-MCNC: 511 PG/ML (ref 211–946)
VLDLC SERPL CALC-MCNC: 35 MG/DL (ref 5–40)

## 2023-10-24 ENCOUNTER — TELEPHONE (OUTPATIENT)
Dept: ENDOCRINOLOGY | Age: 63
End: 2023-10-24
Payer: COMMERCIAL

## 2023-10-24 NOTE — TELEPHONE ENCOUNTER
"10/24/2023 Called and left patient a message to call back.     Relay     \"Cholesterol levels improving   A1c in normal range   Thyroid labs stable   Vitamin levels normal \"                "

## 2023-10-24 NOTE — TELEPHONE ENCOUNTER
----- Message from GILBERTO Fountain sent at 10/23/2023 12:30 PM EDT -----  Cholesterol levels improving  A1c in normal range   Thyroid labs stable  Vitamin levels normal

## 2023-10-27 DIAGNOSIS — Z78.0 MENOPAUSE PRESENT: ICD-10-CM

## 2023-10-27 DIAGNOSIS — E66.9 GENERALIZED OBESITY: ICD-10-CM

## 2023-10-27 DIAGNOSIS — E79.0 HYPERURICEMIA: ICD-10-CM

## 2023-10-27 DIAGNOSIS — R73.03 PREDIABETES: ICD-10-CM

## 2023-10-27 DIAGNOSIS — E78.5 DYSLIPIDEMIA: ICD-10-CM

## 2023-10-27 RX ORDER — NEEDLES, DISPOSABLE 25GX5/8"
NEEDLE, DISPOSABLE MISCELLANEOUS
Qty: 100 EACH | Refills: 3 | Status: SHIPPED | OUTPATIENT
Start: 2023-10-27

## 2023-10-27 RX ORDER — ALLOPURINOL 100 MG/1
100 TABLET ORAL DAILY
Qty: 90 TABLET | Refills: 0 | OUTPATIENT
Start: 2023-10-27

## 2023-10-27 RX ORDER — ROSUVASTATIN CALCIUM 10 MG/1
10 TABLET, COATED ORAL
Qty: 90 TABLET | Refills: 1 | Status: SHIPPED | OUTPATIENT
Start: 2023-10-27

## 2023-10-27 RX ORDER — LIRAGLUTIDE 6 MG/ML
3 INJECTION, SOLUTION SUBCUTANEOUS DAILY
Qty: 15 ML | Refills: 5 | Status: SHIPPED | OUTPATIENT
Start: 2023-10-27

## 2023-10-27 NOTE — TELEPHONE ENCOUNTER
"Rx Refill Note  Requested Prescriptions     Pending Prescriptions Disp Refills    Saxenda 18 MG/3ML injection pen 15 mL 5     Sig: Inject 3 mg under the skin into the appropriate area as directed Daily.    metFORMIN (Glucophage) 500 MG tablet 180 tablet 1     Sig: Take 1 tab in the morning x 1 week, then take 1 tab in the morning and 1 tab at night and stay there.    rosuvastatin (CRESTOR) 10 MG tablet 90 tablet 1     Sig: Take 1 tablet by mouth every night at bedtime.    BD Disp Needles 30G X 1/2\" misc 100 each 3     Sig: Use daily for injection of Saxenda.    allopurinol (Zyloprim) 100 MG tablet 90 tablet 0     Sig: Take 1 tablet by mouth Daily.    sertraline (ZOLOFT) 50 MG tablet 90 tablet 0     Sig: Take 1 tablet by mouth Daily.      Last office visit with prescribing clinician: 10/18/2023   Last telemedicine visit with prescribing clinician: Visit date not found   Next office visit with prescribing clinician: 4/18/2024                         Would you like a call back once the refill request has been completed: [] Yes [] No    If the office needs to give you a call back, can they leave a voicemail: [] Yes [] No    Reena Robledo MA  10/27/23, 11:46 EDT    "

## 2024-04-05 ENCOUNTER — TELEPHONE (OUTPATIENT)
Dept: ENDOCRINOLOGY | Age: 64
End: 2024-04-05
Payer: COMMERCIAL

## 2024-04-05 DIAGNOSIS — R73.03 PREDIABETES: Primary | ICD-10-CM

## 2024-04-05 DIAGNOSIS — E04.2 MULTIPLE THYROID NODULES: ICD-10-CM

## 2024-04-05 DIAGNOSIS — E78.5 DYSLIPIDEMIA: ICD-10-CM

## 2024-04-11 ENCOUNTER — TELEPHONE (OUTPATIENT)
Dept: ENDOCRINOLOGY | Age: 64
End: 2024-04-11

## 2024-04-12 ENCOUNTER — LAB (OUTPATIENT)
Dept: ENDOCRINOLOGY | Age: 64
End: 2024-04-12
Payer: COMMERCIAL

## 2024-04-12 DIAGNOSIS — R73.03 PREDIABETES: ICD-10-CM

## 2024-04-12 DIAGNOSIS — E78.5 DYSLIPIDEMIA: ICD-10-CM

## 2024-04-12 DIAGNOSIS — E04.2 MULTIPLE THYROID NODULES: ICD-10-CM

## 2024-04-15 LAB
25(OH)D3+25(OH)D2 SERPL-MCNC: 35.1 NG/ML (ref 30–100)
ALBUMIN SERPL-MCNC: 4.4 G/DL (ref 3.5–5.2)
ALBUMIN/GLOB SERPL: 1.5 G/DL
ALP SERPL-CCNC: 96 U/L (ref 39–117)
ALT SERPL-CCNC: 34 U/L (ref 1–33)
AST SERPL-CCNC: 34 U/L (ref 1–32)
BILIRUB SERPL-MCNC: 0.5 MG/DL (ref 0–1.2)
BUN SERPL-MCNC: 15 MG/DL (ref 8–23)
BUN/CREAT SERPL: 14 (ref 7–25)
CALCIUM SERPL-MCNC: 9.8 MG/DL (ref 8.6–10.5)
CHLORIDE SERPL-SCNC: 103 MMOL/L (ref 98–107)
CHOLEST SERPL-MCNC: 238 MG/DL (ref 0–200)
CO2 SERPL-SCNC: 24.6 MMOL/L (ref 22–29)
CREAT SERPL-MCNC: 1.07 MG/DL (ref 0.57–1)
EGFRCR SERPLBLD CKD-EPI 2021: 58.9 ML/MIN/1.73
FOLATE SERPL-MCNC: 7.05 NG/ML (ref 4.78–24.2)
GLOBULIN SER CALC-MCNC: 2.9 GM/DL
GLUCOSE SERPL-MCNC: 101 MG/DL (ref 65–99)
HBA1C MFR BLD: 6.1 % (ref 4.8–5.6)
HDLC SERPL-MCNC: 52 MG/DL (ref 40–60)
IMP & REVIEW OF LAB RESULTS: NORMAL
LDLC SERPL CALC-MCNC: 166 MG/DL (ref 0–100)
POTASSIUM SERPL-SCNC: 4.3 MMOL/L (ref 3.5–5.2)
PROT SERPL-MCNC: 7.3 G/DL (ref 6–8.5)
SODIUM SERPL-SCNC: 139 MMOL/L (ref 136–145)
T4 FREE SERPL-MCNC: 1.13 NG/DL (ref 0.93–1.7)
TRIGL SERPL-MCNC: 114 MG/DL (ref 0–150)
TSH SERPL DL<=0.005 MIU/L-ACNC: 1.21 UIU/ML (ref 0.27–4.2)
VIT B12 SERPL-MCNC: 500 PG/ML (ref 211–946)
VLDLC SERPL CALC-MCNC: 20 MG/DL (ref 5–40)

## 2024-04-18 ENCOUNTER — OFFICE VISIT (OUTPATIENT)
Dept: ENDOCRINOLOGY | Age: 64
End: 2024-04-18
Payer: COMMERCIAL

## 2024-04-18 ENCOUNTER — LAB (OUTPATIENT)
Facility: HOSPITAL | Age: 64
End: 2024-04-18
Payer: COMMERCIAL

## 2024-04-18 VITALS
OXYGEN SATURATION: 98 % | WEIGHT: 239.2 LBS | SYSTOLIC BLOOD PRESSURE: 128 MMHG | BODY MASS INDEX: 38.44 KG/M2 | HEIGHT: 66 IN | HEART RATE: 112 BPM | DIASTOLIC BLOOD PRESSURE: 86 MMHG

## 2024-04-18 DIAGNOSIS — E78.5 DYSLIPIDEMIA: ICD-10-CM

## 2024-04-18 DIAGNOSIS — R73.03 PREDIABETES: ICD-10-CM

## 2024-04-18 DIAGNOSIS — E04.2 MULTIPLE THYROID NODULES: ICD-10-CM

## 2024-04-18 DIAGNOSIS — E66.01 CLASS 2 SEVERE OBESITY DUE TO EXCESS CALORIES WITH SERIOUS COMORBIDITY AND BODY MASS INDEX (BMI) OF 38.0 TO 38.9 IN ADULT: Primary | ICD-10-CM

## 2024-04-18 LAB
ALBUMIN SERPL-MCNC: 4.4 G/DL (ref 3.5–5.2)
ALBUMIN/GLOB SERPL: 1.5 G/DL
ALP SERPL-CCNC: 93 U/L (ref 39–117)
ALT SERPL W P-5'-P-CCNC: 24 U/L (ref 1–33)
ANION GAP SERPL CALCULATED.3IONS-SCNC: 10.7 MMOL/L (ref 5–15)
AST SERPL-CCNC: 25 U/L (ref 1–32)
BILIRUB SERPL-MCNC: <0.2 MG/DL (ref 0–1.2)
BUN SERPL-MCNC: 23 MG/DL (ref 8–23)
BUN/CREAT SERPL: 25.3 (ref 7–25)
CALCIUM SPEC-SCNC: 9.6 MG/DL (ref 8.6–10.5)
CHLORIDE SERPL-SCNC: 108 MMOL/L (ref 98–107)
CO2 SERPL-SCNC: 24.3 MMOL/L (ref 22–29)
CREAT SERPL-MCNC: 0.91 MG/DL (ref 0.57–1)
EGFRCR SERPLBLD CKD-EPI 2021: 71.5 ML/MIN/1.73
GLOBULIN UR ELPH-MCNC: 3 GM/DL
GLUCOSE SERPL-MCNC: 105 MG/DL (ref 65–99)
POTASSIUM SERPL-SCNC: 4.4 MMOL/L (ref 3.5–5.2)
PROT SERPL-MCNC: 7.4 G/DL (ref 6–8.5)
SODIUM SERPL-SCNC: 143 MMOL/L (ref 136–145)

## 2024-04-18 PROCEDURE — 80053 COMPREHEN METABOLIC PANEL: CPT | Performed by: NURSE PRACTITIONER

## 2024-04-18 PROCEDURE — 99214 OFFICE O/P EST MOD 30 MIN: CPT | Performed by: NURSE PRACTITIONER

## 2024-04-18 PROCEDURE — 36415 COLL VENOUS BLD VENIPUNCTURE: CPT | Performed by: NURSE PRACTITIONER

## 2024-04-18 NOTE — PROGRESS NOTES
"Chief Complaint  Prediabetes    Subjective        Lani Craft presents to [facility] ENDOCRINOLOGY  History of Present Illness     Obesity  Off Saxenda, has gained 30 pounds   Reviewed thyroid cancer risk and her overdue thyroid u/s    Currently on metformin 500mg daily   On rosuvastatin 10mg nightly- may have missed dosing r/t worsening LDL on lab results    Not able to get in more exercise r/t hours at work     Objective   Vital Signs:  /86 (BP Location: Left arm, Patient Position: Sitting)   Pulse 112   Ht 167.6 cm (65.98\")   Wt 109 kg (239 lb 3.2 oz)   SpO2 98%   BMI 38.63 kg/m²   Estimated body mass index is 38.63 kg/m² as calculated from the following:    Height as of this encounter: 167.6 cm (65.98\").    Weight as of this encounter: 109 kg (239 lb 3.2 oz).           Physical Exam  Vitals reviewed.   Constitutional:       General: She is not in acute distress.  HENT:      Head: Normocephalic and atraumatic.   Cardiovascular:      Rate and Rhythm: Normal rate and regular rhythm.   Pulmonary:      Effort: Pulmonary effort is normal. No respiratory distress.   Musculoskeletal:         General: No signs of injury. Normal range of motion.      Cervical back: Normal range of motion and neck supple.   Skin:     General: Skin is warm and dry.   Neurological:      Mental Status: She is alert and oriented to person, place, and time. Mental status is at baseline.   Psychiatric:         Mood and Affect: Mood normal.         Behavior: Behavior normal.         Thought Content: Thought content normal.         Judgment: Judgment normal.            Result Review :    The following data was reviewed by: GILBERTO Fountain on 04/18/2024:  Common labs          10/18/2023    13:44 4/12/2024    09:17   Common Labs   Glucose 97  101    BUN 14  15    Creatinine 0.76  1.07    Sodium 138  139    Potassium 4.3  4.3    Chloride 103  103    Calcium 10.0  9.8    Total Protein 7.1  7.3    Albumin 4.5  4.4  "   Total Bilirubin <0.2  0.5    Alkaline Phosphatase 83  96    AST (SGOT) 33  34    ALT (SGPT) 31  34    Total Cholesterol 190  238    Triglycerides 205  114    HDL Cholesterol 51  52    LDL Cholesterol  104  166    Hemoglobin A1C 5.60  6.10                   Assessment and Plan     Diagnoses and all orders for this visit:    1. Class 2 severe obesity due to excess calories with serious comorbidity and body mass index (BMI) of 38.0 to 38.9 in adult (Primary)  -     Comprehensive Metabolic Panel    2. Prediabetes  -     metFORMIN (Glucophage) 500 MG tablet; Take 1 tablet by mouth Daily With Breakfast. Take 1 tab in the morning x 1 week, then take 1 tab in the morning and 1 tab at night and stay there.    3. Dyslipidemia    4. Multiple thyroid nodules  -     US Thyroid; Future    Other orders  -     Discontinue: Tirzepatide-Weight Management (ZEPBOUND) 2.5 MG/0.5ML solution auto-injector; Inject 0.5 mL under the skin into the appropriate area as directed 1 (One) Time Per Week.  Dispense: 2 mL; Refill: 0  -     Tirzepatide-Weight Management (ZEPBOUND) 2.5 MG/0.5ML solution auto-injector; Inject 0.5 mL under the skin into the appropriate area as directed 1 (One) Time Per Week. Must get thyroid u/s before starting  Dispense: 2 mL; Refill: 0             Follow Up     Return in about 6 months (around 10/18/2024).    Re-educated on thyroid cancer risk with use of glp-1 therapy- get thyroid u/s before starting zepbound    BMI not at goal  A1c favorable, continue metformin  Ensure statin therapy daily, LDL worse   Will need to get thyroid u/s     Patient was given instructions and counseling regarding her condition or for health maintenance advice. Please see specific information pulled into the AVS if appropriate.     Malena Boles, APRN

## 2024-05-06 DIAGNOSIS — R73.03 PREDIABETES: ICD-10-CM

## 2024-05-06 DIAGNOSIS — E78.5 DYSLIPIDEMIA: ICD-10-CM

## 2024-05-06 RX ORDER — ROSUVASTATIN CALCIUM 10 MG/1
10 TABLET, COATED ORAL
Qty: 90 TABLET | Refills: 1 | Status: SHIPPED | OUTPATIENT
Start: 2024-05-06

## 2024-05-28 ENCOUNTER — TELEPHONE (OUTPATIENT)
Dept: ENDOCRINOLOGY | Age: 64
End: 2024-05-28
Payer: COMMERCIAL

## 2024-10-11 DIAGNOSIS — E66.01 CLASS 2 SEVERE OBESITY DUE TO EXCESS CALORIES WITH SERIOUS COMORBIDITY AND BODY MASS INDEX (BMI) OF 38.0 TO 38.9 IN ADULT: ICD-10-CM

## 2024-10-11 DIAGNOSIS — E66.812 CLASS 2 SEVERE OBESITY DUE TO EXCESS CALORIES WITH SERIOUS COMORBIDITY AND BODY MASS INDEX (BMI) OF 38.0 TO 38.9 IN ADULT: ICD-10-CM

## 2024-10-11 DIAGNOSIS — R73.03 PREDIABETES: Primary | ICD-10-CM

## 2024-10-22 ENCOUNTER — OFFICE VISIT (OUTPATIENT)
Dept: ENDOCRINOLOGY | Age: 64
End: 2024-10-22
Payer: COMMERCIAL

## 2024-10-22 VITALS
BODY MASS INDEX: 37.32 KG/M2 | WEIGHT: 232.2 LBS | OXYGEN SATURATION: 96 % | DIASTOLIC BLOOD PRESSURE: 82 MMHG | SYSTOLIC BLOOD PRESSURE: 126 MMHG | TEMPERATURE: 98.4 F | HEART RATE: 107 BPM | HEIGHT: 66 IN

## 2024-10-22 DIAGNOSIS — R73.03 PREDIABETES: Primary | ICD-10-CM

## 2024-10-22 DIAGNOSIS — E66.812 CLASS 2 SEVERE OBESITY DUE TO EXCESS CALORIES WITH SERIOUS COMORBIDITY AND BODY MASS INDEX (BMI) OF 37.0 TO 37.9 IN ADULT: ICD-10-CM

## 2024-10-22 DIAGNOSIS — E78.5 DYSLIPIDEMIA: ICD-10-CM

## 2024-10-22 DIAGNOSIS — E66.01 CLASS 2 SEVERE OBESITY DUE TO EXCESS CALORIES WITH SERIOUS COMORBIDITY AND BODY MASS INDEX (BMI) OF 37.0 TO 37.9 IN ADULT: ICD-10-CM

## 2024-10-22 DIAGNOSIS — E04.1 SOLITARY THYROID NODULE: ICD-10-CM

## 2024-10-22 PROCEDURE — 99214 OFFICE O/P EST MOD 30 MIN: CPT | Performed by: NURSE PRACTITIONER

## 2024-10-22 RX ORDER — DIPHENOXYLATE HYDROCHLORIDE AND ATROPINE SULFATE 2.5; .025 MG/1; MG/1
1 TABLET ORAL DAILY
COMMUNITY

## 2024-10-22 RX ORDER — ROSUVASTATIN CALCIUM 10 MG/1
10 TABLET, COATED ORAL
Qty: 90 TABLET | Refills: 1 | Status: SHIPPED | OUTPATIENT
Start: 2024-10-22

## 2024-10-22 NOTE — PROGRESS NOTES
"Chief Complaint  Obesity    Subjective        Lani Craft presents to [facility] ENDOCRINOLOGY  History of Present Illness    Obesity  Started mounjaro 2.5mg weekly x 5 doses   Working with dietician at [facility]- low carb diet, 50g/day   No weight gain since last visit 4/2024  thyroid u/s 10/24: FNA recommended    Prediabetes: metformin 500mg BID  CV: rosuvastatin 10mg nightly  Not active r/t her job      Objective   Vital Signs:  /82   Pulse 107   Temp 98.4 °F (36.9 °C) (Oral)   Ht 167.6 cm (65.98\")   Wt 105 kg (232 lb 3.2 oz)   SpO2 96%   BMI 37.50 kg/m²   Estimated body mass index is 37.5 kg/m² as calculated from the following:    Height as of this encounter: 167.6 cm (65.98\").    Weight as of this encounter: 105 kg (232 lb 3.2 oz).          Physical Exam  Vitals reviewed.   Constitutional:       General: She is not in acute distress.  HENT:      Head: Normocephalic and atraumatic.   Cardiovascular:      Rate and Rhythm: Normal rate.   Pulmonary:      Effort: Pulmonary effort is normal. No respiratory distress.   Musculoskeletal:         General: No signs of injury. Normal range of motion.      Cervical back: Normal range of motion and neck supple.   Skin:     General: Skin is warm and dry.   Neurological:      Mental Status: She is alert and oriented to person, place, and time. Mental status is at baseline.   Psychiatric:         Mood and Affect: Mood normal.         Behavior: Behavior normal.         Thought Content: Thought content normal.         Judgment: Judgment normal.        Result Review :  The following data was reviewed by: GILBERTO Fountain on 10/22/2024:  Common labs          4/12/2024    09:17 4/18/2024    15:34 10/15/2024    09:10   Common Labs   Glucose 101  105  98    BUN 15  23  14    Creatinine 1.07  0.91  1.05    Sodium 139  143  140    Potassium 4.3  4.4  4.9    Chloride 103  108  103    Calcium 9.8  9.6  9.6    Total Protein 7.3   7.1    Albumin 4.4  4.4  4.4  "   Total Bilirubin 0.5  <0.2  0.3    Alkaline Phosphatase 96  93  78    AST (SGOT) 34  25  20    ALT (SGPT) 34  24  13    Total Cholesterol 238   121    Triglycerides 114   117    HDL Cholesterol 52   52    LDL Cholesterol  166   48    Hemoglobin A1C 6.10   5.60                Assessment and Plan   Diagnoses and all orders for this visit:    1. Prediabetes (Primary)    2. Dyslipidemia  -     rosuvastatin (CRESTOR) 10 MG tablet; Take 1 tablet by mouth every night at bedtime.  Dispense: 90 tablet; Refill: 1    3. Class 2 severe obesity due to excess calories with serious comorbidity and body mass index (BMI) of 37.0 to 37.9 in adult  -     Tirzepatide-Weight Management (ZEPBOUND) 5 MG/0.5ML solution auto-injector; Inject 0.5 mL under the skin into the appropriate area as directed 1 (One) Time Per Week.  Dispense: 2 mL; Refill: 0    4. Solitary thyroid nodule  -     Obtain Informed Consent; Standing  -     US Guided Thyroid Biopsy; Future  -     Non-gynecologic Cytology; Standing  -     ThyGenX(TM) With / ThyraMIR (TM) Rfx; Future    Other orders  -     Discontinue: Tirzepatide (MOUNJARO) 5 MG/0.5ML solution pen-injector pen; Inject 0.5 mL under the skin into the appropriate area as directed 1 (One) Time Per Week.  Dispense: 2 mL; Refill: 0             Follow Up   Return in about 6 months (around 4/22/2025).    Continue with weight loss program  Consider focusing on activity levels  Continue < 50g/carb/ day eating patter  Increase zepbound to 5mg weekly with next refill  Continue statin, LDL at goal  A1c in range, continue metformin   Arrange FNA r/t thyroid u/s results     Patient was given instructions and counseling regarding her condition or for health maintenance advice. Please see specific information pulled into the AVS if appropriate.     GILBERTO Fountain

## 2024-11-02 DIAGNOSIS — R73.03 PREDIABETES: ICD-10-CM

## 2024-11-04 NOTE — TELEPHONE ENCOUNTER
Rx Refill Note  Requested Prescriptions     Pending Prescriptions Disp Refills    metFORMIN (GLUCOPHAGE) 500 MG tablet [Pharmacy Med Name: METFORMIN  MG TABLET] 180 tablet 1     Sig: TAKE 1 TABLET BY MOUTH EVERY MORNING FOR 7 DAYS, THEN TAKE 1 TABLET BY MOUTH EVERY MORNING AND TAKE ONE TABLET BY MOUTH ONCE NIGHTLY THEREAFTER      Last office visit with prescribing clinician: 10/22/2024   Last telemedicine visit with prescribing clinician: Visit date not found   Next office visit with prescribing clinician: 4/22/2025                         Would you like a call back once the refill request has been completed: [] Yes [] No    If the office needs to give you a call back, can they leave a voicemail: [] Yes [] No    Tessy Arriaga MA  11/04/24, 08:27 EST

## 2024-11-10 NOTE — PROGRESS NOTES
11/22/24 0001   Pre-Procedure Phone Call   Procedure Time Verified Yes   Arrival Time 1230   Procedure Location Verified Yes   Medical History Reviewed Yes   NPO Status Reinforced Yes  (after 0800)   Ride and Caregiver Arranged No   Patient Knows to Bring Current Medications No   Bring Outside Films Requested No

## 2024-11-18 DIAGNOSIS — E66.01 CLASS 2 SEVERE OBESITY DUE TO EXCESS CALORIES WITH SERIOUS COMORBIDITY AND BODY MASS INDEX (BMI) OF 37.0 TO 37.9 IN ADULT: ICD-10-CM

## 2024-11-18 DIAGNOSIS — E66.812 CLASS 2 SEVERE OBESITY DUE TO EXCESS CALORIES WITH SERIOUS COMORBIDITY AND BODY MASS INDEX (BMI) OF 37.0 TO 37.9 IN ADULT: ICD-10-CM

## 2024-11-18 RX ORDER — TIRZEPATIDE 5 MG/.5ML
INJECTION, SOLUTION SUBCUTANEOUS
Qty: 2 ML | Refills: 0 | Status: SHIPPED | OUTPATIENT
Start: 2024-11-18

## 2024-11-18 NOTE — TELEPHONE ENCOUNTER
Rx Refill Note  Requested Prescriptions     Pending Prescriptions Disp Refills    Zepbound 5 MG/0.5ML solution auto-injector [Pharmacy Med Name: ZEPBOUND 5 MG/0.5 ML PEN] 2 mL 0     Sig: INJECT 5 MG UNDER THE SKIN ONCE WEEKLY      Last office visit with prescribing clinician: 10/22/2024   Last telemedicine visit with prescribing clinician: Visit date not found   Next office visit with prescribing clinician: 4/22/2025                         Would you like a call back once the refill request has been completed: [] Yes [] No    If the office needs to give you a call back, can they leave a voicemail: [] Yes [] No    Jazmine Rooney  11/18/24, 08:21 EST

## 2024-11-22 ENCOUNTER — HOSPITAL ENCOUNTER (OUTPATIENT)
Dept: ULTRASOUND IMAGING | Facility: HOSPITAL | Age: 64
Discharge: HOME | End: 2024-11-22
Payer: COMMERCIAL

## 2024-11-22 VITALS
RESPIRATION RATE: 16 BRPM | TEMPERATURE: 97.1 F | OXYGEN SATURATION: 100 % | DIASTOLIC BLOOD PRESSURE: 70 MMHG | HEIGHT: 66 IN | BODY MASS INDEX: 35.68 KG/M2 | WEIGHT: 222 LBS | SYSTOLIC BLOOD PRESSURE: 115 MMHG | HEART RATE: 76 BPM

## 2024-11-22 DIAGNOSIS — E04.1 SOLITARY THYROID NODULE: ICD-10-CM

## 2024-11-22 PROCEDURE — 88305 TISSUE EXAM BY PATHOLOGIST: CPT | Performed by: NURSE PRACTITIONER

## 2024-11-22 PROCEDURE — 25010000002 LIDOCAINE 1 % SOLUTION: Performed by: NURSE PRACTITIONER

## 2024-11-22 PROCEDURE — 88173 CYTOPATH EVAL FNA REPORT: CPT | Performed by: NURSE PRACTITIONER

## 2024-11-22 RX ORDER — SODIUM CHLORIDE 0.9 % (FLUSH) 0.9 %
10 SYRINGE (ML) INJECTION AS NEEDED
Status: DISCONTINUED | OUTPATIENT
Start: 2024-11-22 | End: 2024-11-23 | Stop reason: HOSPADM

## 2024-11-22 RX ORDER — LIDOCAINE HYDROCHLORIDE 10 MG/ML
10 INJECTION, SOLUTION INFILTRATION; PERINEURAL ONCE
Status: COMPLETED | OUTPATIENT
Start: 2024-11-22 | End: 2024-11-22

## 2024-11-22 RX ORDER — FERROUS SULFATE 325(65) MG
325 TABLET ORAL
COMMUNITY

## 2024-11-22 RX ADMIN — LIDOCAINE HYDROCHLORIDE 4 ML: 10 INJECTION, SOLUTION INFILTRATION; PERINEURAL at 14:18

## 2024-11-22 NOTE — DISCHARGE INSTRUCTIONS
EDUCATION / DISCHARGE INSTRUCTIONS  Aspiration:  An aspiration is a procedure used to take a sample of cells or tissue from a lump, mass or other area of concern.   Within a week the radiologist will send a report to your physician.  A pathologist will also examine the tissue and send a report.  THIS EDUCATION INFORMATION WAS REVIEWED PRIOR TO THE PROCEDURE AND CONSENT.     Post Procedure:    *  Rest today (no pushing pulling or straining).   *  Slowly increase activity tomorow.    *  If you received sedation do not drive for 24 hours.   *  Keep dressing clean and dry.   *  Leave dressing on puncture site for 24 hours.    *  You may shower when dressing removed.   *  If needed, use an ice pack at the site for up to 20 minutes at a time for pain.    Call your doctor if experiencing:   *  Signs of infection such as redness, swelling, excessive pain and / or foul smelling drainage from the puncture site.   *  Chills or fever over 101 degrees (by mouth).   *  Unrelieved pain.   *  Any new or severe symptoms.      Following the procedure:     Follow-up with the ordering physician as directed.    Continue to take other medications as directed by your physician unless otherwise instructed.     If you have any concerns please call the Radiology Nurses Desk at [phone].  You are the most important factor in your recovery.  Follow the above instructions carefully.

## 2024-11-25 LAB
CYTO UR: NORMAL
LAB AP CASE REPORT: NORMAL
LAB AP DIAGNOSIS COMMENT: NORMAL
LAB AP NON-GYN SPECIMEN ADEQUACY: NORMAL
PATH REPORT.FINAL DX SPEC: NORMAL
PATH REPORT.GROSS SPEC: NORMAL

## 2024-12-02 ENCOUNTER — TELEPHONE (OUTPATIENT)
Dept: ENDOCRINOLOGY | Age: 64
End: 2024-12-02
Payer: COMMERCIAL

## 2024-12-02 NOTE — TELEPHONE ENCOUNTER
Left pt a message to return call.    OKAY FOR HUB AND  TO READ RESULTS.      FNA confirm benign nodule, no concerning findings  Will continue to follow with u/s in ~ 6 months

## 2024-12-13 DIAGNOSIS — E66.01 CLASS 2 SEVERE OBESITY DUE TO EXCESS CALORIES WITH SERIOUS COMORBIDITY AND BODY MASS INDEX (BMI) OF 37.0 TO 37.9 IN ADULT: ICD-10-CM

## 2024-12-13 DIAGNOSIS — E66.812 CLASS 2 SEVERE OBESITY DUE TO EXCESS CALORIES WITH SERIOUS COMORBIDITY AND BODY MASS INDEX (BMI) OF 37.0 TO 37.9 IN ADULT: ICD-10-CM

## 2024-12-13 RX ORDER — TIRZEPATIDE 5 MG/.5ML
INJECTION, SOLUTION SUBCUTANEOUS
Qty: 2 ML | Refills: 0 | Status: SHIPPED | OUTPATIENT
Start: 2024-12-13

## 2024-12-13 NOTE — TELEPHONE ENCOUNTER
Rx Refill Note  Requested Prescriptions     Pending Prescriptions Disp Refills    Zepbound 5 MG/0.5ML solution auto-injector [Pharmacy Med Name: ZEPBOUND 5 MG/0.5 ML PEN] 2 mL 0     Sig: INJECT 2.5 MG UNDER THE SKIN ONCE WEEKLY      Last office visit with prescribing clinician: 10/22/2024   Last telemedicine visit with prescribing clinician: Visit date not found   Next office visit with prescribing clinician: 4/22/2025                         Would you like a call back once the refill request has been completed: [] Yes [] No    If the office needs to give you a call back, can they leave a voicemail: [] Yes [] No    Jazmine Rooney  12/13/24, 08:27 EST

## 2024-12-23 DIAGNOSIS — E66.01 CLASS 2 SEVERE OBESITY DUE TO EXCESS CALORIES WITH SERIOUS COMORBIDITY AND BODY MASS INDEX (BMI) OF 37.0 TO 37.9 IN ADULT: ICD-10-CM

## 2024-12-23 DIAGNOSIS — E66.812 CLASS 2 SEVERE OBESITY DUE TO EXCESS CALORIES WITH SERIOUS COMORBIDITY AND BODY MASS INDEX (BMI) OF 37.0 TO 37.9 IN ADULT: ICD-10-CM

## 2024-12-23 RX ORDER — TIRZEPATIDE 5 MG/.5ML
5 INJECTION, SOLUTION SUBCUTANEOUS WEEKLY
Qty: 2 ML | Refills: 0 | Status: SHIPPED | OUTPATIENT
Start: 2024-12-23

## 2024-12-23 RX ORDER — TIRZEPATIDE 2.5 MG/.5ML
INJECTION, SOLUTION SUBCUTANEOUS
Qty: 2 ML | Refills: 0 | OUTPATIENT
Start: 2024-12-23

## 2024-12-23 NOTE — TELEPHONE ENCOUNTER
Rx Refill Note  Requested Prescriptions     Pending Prescriptions Disp Refills    Zepbound 2.5 MG/0.5ML solution auto-injector [Pharmacy Med Name: ZEPBOUND 2.5 MG/0.5 ML PEN] 2 mL 0     Sig: INJECT 2.5 MG UNDER THE SKIN ONCE WEEKLY MUST GET THYROID ULTRASOUND BEFORE STARTING      Last office visit with prescribing clinician: 10/22/2024   Last telemedicine visit with prescribing clinician: Visit date not found   Next office visit with prescribing clinician: 4/22/2025                         Would you like a call back once the refill request has been completed: [] Yes [] No    If the office needs to give you a call back, can they leave a voicemail: [] Yes [] No    Jazmine Rooney  12/23/24, 13:01 EST

## 2024-12-23 NOTE — TELEPHONE ENCOUNTER
Rx Refill Note  Requested Prescriptions     Pending Prescriptions Disp Refills    Tirzepatide-Weight Management (Zepbound) 5 MG/0.5ML solution auto-injector 2 mL 0     Sig: Inject 0.5 mL under the skin into the appropriate area as directed 1 (One) Time Per Week.      Last office visit with prescribing clinician: 10/22/2024   Last telemedicine visit with prescribing clinician: Visit date not found   Next office visit with prescribing clinician: 4/22/2025                         Would you like a call back once the refill request has been completed: [] Yes [] No    If the office needs to give you a call back, can they leave a voicemail: [] Yes [] No    Jazmine Rooney  12/23/24, 10:53 EST

## 2024-12-23 NOTE — TELEPHONE ENCOUNTER
Rx Refill Note  Requested Prescriptions     Pending Prescriptions Disp Refills    Zepbound 2.5 MG/0.5ML solution auto-injector [Pharmacy Med Name: ZEPBOUND 2.5 MG/0.5 ML PEN] 2 mL 0     Sig: INJECT 2.5 MG UNDER THE SKIN ONCE WEEKLY MUST GET THYROID ULTRASOUND BEFORE STARTING      Last office visit with prescribing clinician: 10/22/2024   Last telemedicine visit with prescribing clinician: Visit date not found   Next office visit with prescribing clinician: 4/22/2025                         Would you like a call back once the refill request has been completed: [] Yes [] No    If the office needs to give you a call back, can they leave a voicemail: [] Yes [] No    Valarie Rooney MA  12/23/24, 08:40 EST

## 2025-01-19 DIAGNOSIS — E66.01 CLASS 2 SEVERE OBESITY DUE TO EXCESS CALORIES WITH SERIOUS COMORBIDITY AND BODY MASS INDEX (BMI) OF 37.0 TO 37.9 IN ADULT: ICD-10-CM

## 2025-01-19 DIAGNOSIS — E66.812 CLASS 2 SEVERE OBESITY DUE TO EXCESS CALORIES WITH SERIOUS COMORBIDITY AND BODY MASS INDEX (BMI) OF 37.0 TO 37.9 IN ADULT: ICD-10-CM

## 2025-01-20 RX ORDER — TIRZEPATIDE 5 MG/.5ML
INJECTION, SOLUTION SUBCUTANEOUS
Qty: 2 ML | Refills: 0 | Status: SHIPPED | OUTPATIENT
Start: 2025-01-20

## 2025-01-20 NOTE — TELEPHONE ENCOUNTER
Rx Refill Note  Requested Prescriptions     Pending Prescriptions Disp Refills    Zepbound 5 MG/0.5ML solution auto-injector [Pharmacy Med Name: ZEPBOUND 5 MG/0.5 ML PEN] 2 mL 0     Sig: INJECT 5 MG UNDER THE SKIN ONCE WEEKLY      Last office visit with prescribing clinician: 10/22/2024   Last telemedicine visit with prescribing clinician: Visit date not found   Next office visit with prescribing clinician: 4/22/2025                         Would you like a call back once the refill request has been completed: [] Yes [] No    If the office needs to give you a call back, can they leave a voicemail: [] Yes [] No    Tessy Arriaga MA  01/20/25, 08:42 EST

## 2025-02-24 DIAGNOSIS — E66.812 CLASS 2 SEVERE OBESITY DUE TO EXCESS CALORIES WITH SERIOUS COMORBIDITY AND BODY MASS INDEX (BMI) OF 37.0 TO 37.9 IN ADULT: ICD-10-CM

## 2025-02-24 DIAGNOSIS — E66.01 CLASS 2 SEVERE OBESITY DUE TO EXCESS CALORIES WITH SERIOUS COMORBIDITY AND BODY MASS INDEX (BMI) OF 37.0 TO 37.9 IN ADULT: ICD-10-CM

## 2025-02-24 RX ORDER — TIRZEPATIDE 5 MG/.5ML
5 INJECTION, SOLUTION SUBCUTANEOUS WEEKLY
Qty: 2 ML | Refills: 0 | Status: SHIPPED | OUTPATIENT
Start: 2025-02-24

## 2025-02-24 NOTE — TELEPHONE ENCOUNTER
Rx Refill Note  Requested Prescriptions     Pending Prescriptions Disp Refills    Tirzepatide-Weight Management (Zepbound) 5 MG/0.5ML solution auto-injector 2 mL 0     Sig: Inject 0.5 mL under the skin into the appropriate area as directed 1 (One) Time Per Week.      Last office visit with prescribing clinician: 10/22/2024   Last telemedicine visit with prescribing clinician: Visit date not found   Next office visit with prescribing clinician: 4/22/2025                         Would you like a call back once the refill request has been completed: [] Yes [] No    If the office needs to give you a call back, can they leave a voicemail: [] Yes [] No    Valarie Rooney MA  02/24/25, 09:49 EST

## 2025-03-24 DIAGNOSIS — E66.812 CLASS 2 SEVERE OBESITY DUE TO EXCESS CALORIES WITH SERIOUS COMORBIDITY AND BODY MASS INDEX (BMI) OF 37.0 TO 37.9 IN ADULT: ICD-10-CM

## 2025-03-24 DIAGNOSIS — E66.01 CLASS 2 SEVERE OBESITY DUE TO EXCESS CALORIES WITH SERIOUS COMORBIDITY AND BODY MASS INDEX (BMI) OF 37.0 TO 37.9 IN ADULT: ICD-10-CM

## 2025-03-24 RX ORDER — TIRZEPATIDE 5 MG/.5ML
INJECTION, SOLUTION SUBCUTANEOUS
Qty: 2 ML | Refills: 0 | Status: SHIPPED | OUTPATIENT
Start: 2025-03-24

## 2025-03-24 NOTE — TELEPHONE ENCOUNTER
Rx Refill Note  Requested Prescriptions     Pending Prescriptions Disp Refills    Zepbound 5 MG/0.5ML solution auto-injector [Pharmacy Med Name: ZEPBOUND 5 MG/0.5 ML PEN] 2 mL 0     Sig: INJECT 5 MG UNDER THE SKIN ONCE WEEKLY      Last office visit with prescribing clinician: 10/22/2024   Last telemedicine visit with prescribing clinician: Visit date not found   Next office visit with prescribing clinician: 4/22/2025                         Would you like a call back once the refill request has been completed: [] Yes [] No    If the office needs to give you a call back, can they leave a voicemail: [] Yes [] No    Tessy Arriaga MA  03/24/25, 11:38 EDT

## 2025-04-11 DIAGNOSIS — E66.812 CLASS 2 SEVERE OBESITY DUE TO EXCESS CALORIES WITH SERIOUS COMORBIDITY AND BODY MASS INDEX (BMI) OF 37.0 TO 37.9 IN ADULT: Primary | ICD-10-CM

## 2025-04-11 DIAGNOSIS — E66.01 CLASS 2 SEVERE OBESITY DUE TO EXCESS CALORIES WITH SERIOUS COMORBIDITY AND BODY MASS INDEX (BMI) OF 37.0 TO 37.9 IN ADULT: Primary | ICD-10-CM

## 2025-04-16 ENCOUNTER — LAB (OUTPATIENT)
Facility: HOSPITAL | Age: 65
End: 2025-04-16
Payer: COMMERCIAL

## 2025-04-16 PROCEDURE — 82607 VITAMIN B-12: CPT | Performed by: NURSE PRACTITIONER

## 2025-04-16 PROCEDURE — 83036 HEMOGLOBIN GLYCOSYLATED A1C: CPT | Performed by: NURSE PRACTITIONER

## 2025-04-16 PROCEDURE — 80053 COMPREHEN METABOLIC PANEL: CPT | Performed by: NURSE PRACTITIONER

## 2025-04-16 PROCEDURE — 84443 ASSAY THYROID STIM HORMONE: CPT | Performed by: NURSE PRACTITIONER

## 2025-04-16 PROCEDURE — 82746 ASSAY OF FOLIC ACID SERUM: CPT | Performed by: NURSE PRACTITIONER

## 2025-04-19 DIAGNOSIS — E66.01 CLASS 2 SEVERE OBESITY DUE TO EXCESS CALORIES WITH SERIOUS COMORBIDITY AND BODY MASS INDEX (BMI) OF 37.0 TO 37.9 IN ADULT: ICD-10-CM

## 2025-04-19 DIAGNOSIS — E66.812 CLASS 2 SEVERE OBESITY DUE TO EXCESS CALORIES WITH SERIOUS COMORBIDITY AND BODY MASS INDEX (BMI) OF 37.0 TO 37.9 IN ADULT: ICD-10-CM

## 2025-04-19 DIAGNOSIS — E78.5 DYSLIPIDEMIA: ICD-10-CM

## 2025-04-21 NOTE — TELEPHONE ENCOUNTER
Rx Refill Note  Requested Prescriptions     Pending Prescriptions Disp Refills    rosuvastatin (CRESTOR) 10 MG tablet [Pharmacy Med Name: ROSUVASTATIN CALCIUM 10 MG TAB] 90 tablet 1     Sig: TAKE 1 TABLET BY MOUTH EVERY NIGHT AT BEDTIME    Zepbound 5 MG/0.5ML solution auto-injector [Pharmacy Med Name: ZEPBOUND 5 MG/0.5 ML PEN] 2 mL 0     Sig: INJECT 5 MG UNDER THE SKIN ONCE WEEKLY      Last office visit with prescribing clinician: 10/22/2024   Last telemedicine visit with prescribing clinician: Visit date not found   Next office visit with prescribing clinician: 4/22/2025                         Would you like a call back once the refill request has been completed: [] Yes [] No    If the office needs to give you a call back, can they leave a voicemail: [] Yes [] No    Reena Robledo MA  04/21/25, 08:11 EDT

## 2025-04-22 ENCOUNTER — OFFICE VISIT (OUTPATIENT)
Dept: ENDOCRINOLOGY | Age: 65
End: 2025-04-22
Payer: COMMERCIAL

## 2025-04-22 ENCOUNTER — PRIOR AUTHORIZATION (OUTPATIENT)
Dept: ENDOCRINOLOGY | Age: 65
End: 2025-04-22

## 2025-04-22 VITALS
HEIGHT: 66 IN | HEART RATE: 81 BPM | DIASTOLIC BLOOD PRESSURE: 80 MMHG | TEMPERATURE: 98.5 F | BODY MASS INDEX: 32.66 KG/M2 | OXYGEN SATURATION: 99 % | WEIGHT: 203.2 LBS | SYSTOLIC BLOOD PRESSURE: 130 MMHG

## 2025-04-22 DIAGNOSIS — E66.811 CLASS 1 OBESITY DUE TO EXCESS CALORIES WITH SERIOUS COMORBIDITY AND BODY MASS INDEX (BMI) OF 32.0 TO 32.9 IN ADULT: ICD-10-CM

## 2025-04-22 DIAGNOSIS — E04.1 SOLITARY THYROID NODULE: Primary | ICD-10-CM

## 2025-04-22 DIAGNOSIS — E66.09 CLASS 1 OBESITY DUE TO EXCESS CALORIES WITH SERIOUS COMORBIDITY AND BODY MASS INDEX (BMI) OF 32.0 TO 32.9 IN ADULT: ICD-10-CM

## 2025-04-22 PROCEDURE — 99214 OFFICE O/P EST MOD 30 MIN: CPT | Performed by: NURSE PRACTITIONER

## 2025-04-22 RX ORDER — ROSUVASTATIN CALCIUM 10 MG/1
10 TABLET, COATED ORAL
Qty: 90 TABLET | Refills: 1 | Status: SHIPPED | OUTPATIENT
Start: 2025-04-22

## 2025-04-22 RX ORDER — TIRZEPATIDE 5 MG/.5ML
INJECTION, SOLUTION SUBCUTANEOUS
Qty: 2 ML | Refills: 0 | OUTPATIENT
Start: 2025-04-22

## 2025-04-22 NOTE — PROGRESS NOTES
"Chief Complaint  Obesity    Subjective        Lani Craft presents to [facility] ENDOCRINOLOGY  History of Present Illness    Obesity  Zepbound 5mg weekly   Down 30 pounds since October 2024, highest weight 250lbs Summer 2024  Follows a low carb diet, 50g/day   thyroid u/s 10/24: FNA recommended    Prediabetes: metformin 500mg BID  CV: has not been taking rosuvastatin 10mg nightly  Reports she isn't able to be active with her long hours at workl   11/2024: Thyroid, Right, Fine Needle Aspiration (FNA):               Taiban Category II:  Changes consistent with benign follicular nodule.  Objective   Vital Signs:  /80   Pulse 81   Temp 98.5 °F (36.9 °C) (Oral)   Ht 167.6 cm (65.98\")   Wt 92.2 kg (203 lb 3.2 oz)   SpO2 99%   BMI 32.81 kg/m²   Estimated body mass index is 32.81 kg/m² as calculated from the following:    Height as of this encounter: 167.6 cm (65.98\").    Weight as of this encounter: 92.2 kg (203 lb 3.2 oz).      Physical Exam  Vitals reviewed.   Constitutional:       General: She is not in acute distress.  HENT:      Head: Normocephalic and atraumatic.   Cardiovascular:      Rate and Rhythm: Normal rate.   Pulmonary:      Effort: Pulmonary effort is normal. No respiratory distress.   Musculoskeletal:         General: No signs of injury. Normal range of motion.      Cervical back: Normal range of motion and neck supple.   Skin:     General: Skin is warm and dry.   Neurological:      Mental Status: She is alert and oriented to person, place, and time. Mental status is at baseline.   Psychiatric:         Mood and Affect: Mood normal.         Behavior: Behavior normal.         Thought Content: Thought content normal.         Judgment: Judgment normal.        Result Review :  The following data was reviewed by: GILBERTO Fountain on 04/22/2025:  Common labs          10/15/2024    09:10 4/16/2025    09:36   Common Labs   Glucose 98  97    BUN 14  16    Creatinine 1.05  1.08  "   Sodium 140  140    Potassium 4.9  4.3    Chloride 103  104    Calcium 9.6  9.9    Albumin 4.4  4.3    Total Bilirubin 0.3  0.4    Alkaline Phosphatase 78  76    AST (SGOT) 20  16    ALT (SGPT) 13  11    Total Cholesterol 121     Triglycerides 117     HDL Cholesterol 52     LDL Cholesterol  48     Hemoglobin A1C 5.60  5.40                Assessment and Plan   Diagnoses and all orders for this visit:    1. Solitary thyroid nodule (Primary)  -     US Thyroid; Future    2. Class 1 obesity due to excess calories with serious comorbidity and body mass index (BMI) of 32.0 to 32.9 in adult  -     Tirzepatide-Weight Management (ZEPBOUND) 7.5 MG/0.5ML solution auto-injector; Inject 0.5 mL under the skin into the appropriate area as directed 1 (One) Time Per Week.  Dispense: 2 mL; Refill: 0             Follow Up   Return in about 6 months (around 10/22/2025).    Stop metformin   Increase zepbound to 7.5mg weekly  Tolerating medication  Strongly encouraged exercise during weight loss to maintain her skeletal muscle mass   Weight loss goals progressing, BMI improving   Thyroid u/s 10/2025  Labs favorable  Patient feeling well overall   No additional changes made at this time   Continue statin     Patient was given instructions and counseling regarding her condition or for health maintenance advice. Please see specific information pulled into the AVS if appropriate.       GILBERTO Fountain

## 2025-04-23 NOTE — TELEPHONE ENCOUNTER
Your PA request has been approved.     Additional information will be provided in the approval communication. (Message [id number]).     Authorization Expiration Date: April 22, 2026.

## 2025-05-03 DIAGNOSIS — R73.03 PREDIABETES: ICD-10-CM

## 2025-05-05 NOTE — TELEPHONE ENCOUNTER
Rx Refill Note  Requested Prescriptions     Pending Prescriptions Disp Refills    metFORMIN (GLUCOPHAGE) 500 MG tablet [Pharmacy Med Name: METFORMIN  MG TABLET] 180 tablet 1     Sig: TAKE 1 TABLET BY MOUTH EVERY MORNING FOR 7 DAYS, THEN TAKE 1 TABLET BY MOUTH EVERY MORNING AND TAKE 1 TABLET BY MOUTH EVERY EVENING THERAFTER      Last office visit with prescribing clinician: 4/22/2025   Last telemedicine visit with prescribing clinician: Visit date not found   Next office visit with prescribing clinician: 10/22/2025                         Would you like a call back once the refill request has been completed: [] Yes [] No    If the office needs to give you a call back, can they leave a voicemail: [] Yes [] No    Tessy Arriaga MA  05/05/25, 08:32 EDT

## 2025-05-18 DIAGNOSIS — E66.09 CLASS 1 OBESITY DUE TO EXCESS CALORIES WITH SERIOUS COMORBIDITY AND BODY MASS INDEX (BMI) OF 32.0 TO 32.9 IN ADULT: ICD-10-CM

## 2025-05-18 DIAGNOSIS — E66.811 CLASS 1 OBESITY DUE TO EXCESS CALORIES WITH SERIOUS COMORBIDITY AND BODY MASS INDEX (BMI) OF 32.0 TO 32.9 IN ADULT: ICD-10-CM

## 2025-05-19 RX ORDER — TIRZEPATIDE 7.5 MG/.5ML
INJECTION, SOLUTION SUBCUTANEOUS
Qty: 2 ML | Refills: 0 | Status: SHIPPED | OUTPATIENT
Start: 2025-05-19

## 2025-05-19 NOTE — TELEPHONE ENCOUNTER
Rx Refill Note  Requested Prescriptions     Pending Prescriptions Disp Refills    Zepbound 7.5 MG/0.5ML solution auto-injector [Pharmacy Med Name: ZEPBOUND 7.5 MG/0.5 ML PEN] 2 mL 0     Sig: INJECT 7.5 MG UNDER THE SKIN ONCE WEEKLY      Last office visit with prescribing clinician: 4/22/2025   Last telemedicine visit with prescribing clinician: Visit date not found   Next office visit with prescribing clinician: 10/22/2025                         Would you like a call back once the refill request has been completed: [] Yes [] No    If the office needs to give you a call back, can they leave a voicemail: [] Yes [] No    Jazmine Rooney  05/19/25, 08:36 EDT  Jazmine Rooney  5/19/2025  08:36 EDT

## 2025-06-12 DIAGNOSIS — E66.09 CLASS 1 OBESITY DUE TO EXCESS CALORIES WITH SERIOUS COMORBIDITY AND BODY MASS INDEX (BMI) OF 32.0 TO 32.9 IN ADULT: ICD-10-CM

## 2025-06-12 DIAGNOSIS — E66.811 CLASS 1 OBESITY DUE TO EXCESS CALORIES WITH SERIOUS COMORBIDITY AND BODY MASS INDEX (BMI) OF 32.0 TO 32.9 IN ADULT: ICD-10-CM

## 2025-06-12 RX ORDER — TIRZEPATIDE 7.5 MG/.5ML
INJECTION, SOLUTION SUBCUTANEOUS
Qty: 2 ML | Refills: 0 | Status: SHIPPED | OUTPATIENT
Start: 2025-06-12

## 2025-06-12 NOTE — TELEPHONE ENCOUNTER
Rx Refill Note  Requested Prescriptions     Pending Prescriptions Disp Refills    Zepbound 7.5 MG/0.5ML solution auto-injector [Pharmacy Med Name: ZEPBOUND 7.5 MG/0.5 ML PEN] 2 mL 0     Sig: INJECT 7.5 MG UNDER THE SKIN ONCE WEEKLY      Last office visit with prescribing clinician: 4/22/2025   Last telemedicine visit with prescribing clinician: Visit date not found   Next office visit with prescribing clinician: 10/22/2025                         Would you like a call back once the refill request has been completed: [] Yes [] No    If the office needs to give you a call back, can they leave a voicemail: [] Yes [] No    Tessy Arriaga MA  06/12/25, 09:00 EDT

## 2025-06-19 ENCOUNTER — PATIENT MESSAGE (OUTPATIENT)
Dept: ENDOCRINOLOGY | Age: 65
End: 2025-06-19
Payer: COMMERCIAL

## 2025-06-23 RX ORDER — SEMAGLUTIDE 0.5 MG/.5ML
0.5 INJECTION, SOLUTION SUBCUTANEOUS WEEKLY
Qty: 2 ML | Refills: 0 | Status: SHIPPED | OUTPATIENT
Start: 2025-06-23

## 2025-07-10 DIAGNOSIS — E66.811 CLASS 1 OBESITY DUE TO EXCESS CALORIES WITH SERIOUS COMORBIDITY AND BODY MASS INDEX (BMI) OF 32.0 TO 32.9 IN ADULT: ICD-10-CM

## 2025-07-10 DIAGNOSIS — E66.09 CLASS 1 OBESITY DUE TO EXCESS CALORIES WITH SERIOUS COMORBIDITY AND BODY MASS INDEX (BMI) OF 32.0 TO 32.9 IN ADULT: ICD-10-CM

## 2025-07-10 RX ORDER — TIRZEPATIDE 7.5 MG/.5ML
INJECTION, SOLUTION SUBCUTANEOUS
Qty: 2 ML | Refills: 0 | OUTPATIENT
Start: 2025-07-10

## 2025-07-10 NOTE — TELEPHONE ENCOUNTER
Rx Refill Note  Requested Prescriptions     Pending Prescriptions Disp Refills    Zepbound 7.5 MG/0.5ML solution auto-injector [Pharmacy Med Name: ZEPBOUND 7.5 MG/0.5 ML PEN] 2 mL 0     Sig: INJECT 7.5 MG UNDER THE SKIN ONCE WEEKLY      Last office visit with prescribing clinician: 4/22/2025   Last telemedicine visit with prescribing clinician: Visit date not found   Next office visit with prescribing clinician: 10/22/2025                         Would you like a call back once the refill request has been completed: [] Yes [] No    If the office needs to give you a call back, can they leave a voicemail: [] Yes [] No    Jazmine Rooney  07/10/25, 07:47 EDT  Jazmine Rooney  7/10/2025  07:47 EDT

## 2025-07-31 RX ORDER — SEMAGLUTIDE 1 MG/.5ML
1 INJECTION, SOLUTION SUBCUTANEOUS WEEKLY
Qty: 2 ML | Refills: 0 | Status: SHIPPED | OUTPATIENT
Start: 2025-07-31

## 2025-07-31 RX ORDER — SEMAGLUTIDE 0.5 MG/.5ML
0.5 INJECTION, SOLUTION SUBCUTANEOUS WEEKLY
Qty: 2 ML | Refills: 0 | OUTPATIENT
Start: 2025-07-31

## 2025-08-20 ENCOUNTER — PRIOR AUTHORIZATION (OUTPATIENT)
Dept: ENDOCRINOLOGY | Age: 65
End: 2025-08-20
Payer: COMMERCIAL

## 2025-08-21 ENCOUNTER — PATIENT MESSAGE (OUTPATIENT)
Dept: ENDOCRINOLOGY | Age: 65
End: 2025-08-21
Payer: COMMERCIAL

## 2025-08-21 RX ORDER — SEMAGLUTIDE 1.7 MG/.75ML
1.7 INJECTION, SOLUTION SUBCUTANEOUS WEEKLY
Qty: 3 ML | Refills: 2 | Status: SHIPPED | OUTPATIENT
Start: 2025-08-21